# Patient Record
Sex: FEMALE | Race: WHITE | NOT HISPANIC OR LATINO
[De-identification: names, ages, dates, MRNs, and addresses within clinical notes are randomized per-mention and may not be internally consistent; named-entity substitution may affect disease eponyms.]

---
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
## 2019-02-26 ENCOUNTER — APPOINTMENT (OUTPATIENT)
Dept: INTERNAL MEDICINE | Facility: CLINIC | Age: 19
End: 2019-02-26

## 2023-07-02 ENCOUNTER — EMERGENCY (EMERGENCY)
Facility: HOSPITAL | Age: 23
LOS: 1 days | Discharge: HOME | End: 2023-07-02
Attending: EMERGENCY MEDICINE | Admitting: EMERGENCY MEDICINE
Payer: MEDICAID

## 2023-07-02 VITALS
TEMPERATURE: 98 F | SYSTOLIC BLOOD PRESSURE: 126 MMHG | WEIGHT: 186.95 LBS | DIASTOLIC BLOOD PRESSURE: 83 MMHG | RESPIRATION RATE: 16 BRPM | HEIGHT: 70 IN | HEART RATE: 83 BPM | OXYGEN SATURATION: 97 %

## 2023-07-02 VITALS
DIASTOLIC BLOOD PRESSURE: 70 MMHG | RESPIRATION RATE: 15 BRPM | SYSTOLIC BLOOD PRESSURE: 130 MMHG | HEART RATE: 78 BPM | TEMPERATURE: 98 F | OXYGEN SATURATION: 99 %

## 2023-07-02 LAB
ALBUMIN SERPL ELPH-MCNC: 4.1 G/DL — SIGNIFICANT CHANGE UP (ref 3.3–5)
ALP SERPL-CCNC: 58 U/L — SIGNIFICANT CHANGE UP (ref 30–120)
ALT FLD-CCNC: 15 U/L DA — SIGNIFICANT CHANGE UP (ref 10–60)
ANION GAP SERPL CALC-SCNC: 8 MMOL/L — SIGNIFICANT CHANGE UP (ref 5–17)
AST SERPL-CCNC: 18 U/L — SIGNIFICANT CHANGE UP (ref 10–40)
BASOPHILS # BLD AUTO: 0.02 K/UL — SIGNIFICANT CHANGE UP (ref 0–0.2)
BASOPHILS NFR BLD AUTO: 0.3 % — SIGNIFICANT CHANGE UP (ref 0–2)
BILIRUB SERPL-MCNC: 0.4 MG/DL — SIGNIFICANT CHANGE UP (ref 0.2–1.2)
BUN SERPL-MCNC: 20 MG/DL — SIGNIFICANT CHANGE UP (ref 7–23)
CALCIUM SERPL-MCNC: 9.1 MG/DL — SIGNIFICANT CHANGE UP (ref 8.4–10.5)
CHLORIDE SERPL-SCNC: 104 MMOL/L — SIGNIFICANT CHANGE UP (ref 96–108)
CO2 SERPL-SCNC: 27 MMOL/L — SIGNIFICANT CHANGE UP (ref 22–31)
CREAT SERPL-MCNC: 0.81 MG/DL — SIGNIFICANT CHANGE UP (ref 0.5–1.3)
D DIMER BLD IA.RAPID-MCNC: <150 NG/ML DDU — SIGNIFICANT CHANGE UP
EGFR: 104 ML/MIN/1.73M2 — SIGNIFICANT CHANGE UP
EOSINOPHIL # BLD AUTO: 0.05 K/UL — SIGNIFICANT CHANGE UP (ref 0–0.5)
EOSINOPHIL NFR BLD AUTO: 0.6 % — SIGNIFICANT CHANGE UP (ref 0–6)
GLUCOSE SERPL-MCNC: 94 MG/DL — SIGNIFICANT CHANGE UP (ref 70–99)
HCT VFR BLD CALC: 35.7 % — SIGNIFICANT CHANGE UP (ref 34.5–45)
HGB BLD-MCNC: 12.3 G/DL — SIGNIFICANT CHANGE UP (ref 11.5–15.5)
IMM GRANULOCYTES NFR BLD AUTO: 0.1 % — SIGNIFICANT CHANGE UP (ref 0–0.9)
LIDOCAIN IGE QN: 69 U/L — LOW (ref 73–393)
LYMPHOCYTES # BLD AUTO: 2.92 K/UL — SIGNIFICANT CHANGE UP (ref 1–3.3)
LYMPHOCYTES # BLD AUTO: 37 % — SIGNIFICANT CHANGE UP (ref 13–44)
MAGNESIUM SERPL-MCNC: 2.2 MG/DL — SIGNIFICANT CHANGE UP (ref 1.6–2.6)
MCHC RBC-ENTMCNC: 29.6 PG — SIGNIFICANT CHANGE UP (ref 27–34)
MCHC RBC-ENTMCNC: 34.5 GM/DL — SIGNIFICANT CHANGE UP (ref 32–36)
MCV RBC AUTO: 86 FL — SIGNIFICANT CHANGE UP (ref 80–100)
MONOCYTES # BLD AUTO: 0.64 K/UL — SIGNIFICANT CHANGE UP (ref 0–0.9)
MONOCYTES NFR BLD AUTO: 8.1 % — SIGNIFICANT CHANGE UP (ref 2–14)
NEUTROPHILS # BLD AUTO: 4.25 K/UL — SIGNIFICANT CHANGE UP (ref 1.8–7.4)
NEUTROPHILS NFR BLD AUTO: 53.9 % — SIGNIFICANT CHANGE UP (ref 43–77)
NRBC # BLD: 0 /100 WBCS — SIGNIFICANT CHANGE UP (ref 0–0)
PLATELET # BLD AUTO: 256 K/UL — SIGNIFICANT CHANGE UP (ref 150–400)
POTASSIUM SERPL-MCNC: 3.7 MMOL/L — SIGNIFICANT CHANGE UP (ref 3.5–5.3)
POTASSIUM SERPL-SCNC: 3.7 MMOL/L — SIGNIFICANT CHANGE UP (ref 3.5–5.3)
PROT SERPL-MCNC: 7 G/DL — SIGNIFICANT CHANGE UP (ref 6–8.3)
RBC # BLD: 4.15 M/UL — SIGNIFICANT CHANGE UP (ref 3.8–5.2)
RBC # FLD: 12.3 % — SIGNIFICANT CHANGE UP (ref 10.3–14.5)
SODIUM SERPL-SCNC: 139 MMOL/L — SIGNIFICANT CHANGE UP (ref 135–145)
TROPONIN I, HIGH SENSITIVITY RESULT: <4 NG/L — SIGNIFICANT CHANGE UP
WBC # BLD: 7.89 K/UL — SIGNIFICANT CHANGE UP (ref 3.8–10.5)
WBC # FLD AUTO: 7.89 K/UL — SIGNIFICANT CHANGE UP (ref 3.8–10.5)

## 2023-07-02 PROCEDURE — 71045 X-RAY EXAM CHEST 1 VIEW: CPT | Mod: 26

## 2023-07-02 PROCEDURE — 96360 HYDRATION IV INFUSION INIT: CPT

## 2023-07-02 PROCEDURE — 93005 ELECTROCARDIOGRAM TRACING: CPT

## 2023-07-02 PROCEDURE — 71045 X-RAY EXAM CHEST 1 VIEW: CPT

## 2023-07-02 PROCEDURE — 99285 EMERGENCY DEPT VISIT HI MDM: CPT | Mod: 25

## 2023-07-02 PROCEDURE — 85379 FIBRIN DEGRADATION QUANT: CPT

## 2023-07-02 PROCEDURE — 36415 COLL VENOUS BLD VENIPUNCTURE: CPT

## 2023-07-02 PROCEDURE — 83735 ASSAY OF MAGNESIUM: CPT

## 2023-07-02 PROCEDURE — 83690 ASSAY OF LIPASE: CPT

## 2023-07-02 PROCEDURE — 70450 CT HEAD/BRAIN W/O DYE: CPT | Mod: MA

## 2023-07-02 PROCEDURE — 80053 COMPREHEN METABOLIC PANEL: CPT

## 2023-07-02 PROCEDURE — 70450 CT HEAD/BRAIN W/O DYE: CPT | Mod: 26,MA

## 2023-07-02 PROCEDURE — 84484 ASSAY OF TROPONIN QUANT: CPT

## 2023-07-02 PROCEDURE — 85025 COMPLETE CBC W/AUTO DIFF WBC: CPT

## 2023-07-02 PROCEDURE — 99285 EMERGENCY DEPT VISIT HI MDM: CPT

## 2023-07-02 PROCEDURE — 93010 ELECTROCARDIOGRAM REPORT: CPT

## 2023-07-02 RX ORDER — SODIUM CHLORIDE 9 MG/ML
1000 INJECTION INTRAMUSCULAR; INTRAVENOUS; SUBCUTANEOUS ONCE
Refills: 0 | Status: COMPLETED | OUTPATIENT
Start: 2023-07-02 | End: 2023-07-02

## 2023-07-02 RX ADMIN — SODIUM CHLORIDE 1000 MILLILITER(S): 9 INJECTION INTRAMUSCULAR; INTRAVENOUS; SUBCUTANEOUS at 20:57

## 2023-07-02 RX ADMIN — SODIUM CHLORIDE 1000 MILLILITER(S): 9 INJECTION INTRAMUSCULAR; INTRAVENOUS; SUBCUTANEOUS at 19:57

## 2023-07-02 NOTE — ED PROVIDER NOTE - PROVIDER TOKENS
PROVIDER:[TOKEN:[[id number]],FOLLOWUP:[1-3 Days]],PROVIDER:[TOKEN:[[id number]],FOLLOWUP:[1-3 Days]]

## 2023-07-02 NOTE — ED ADULT TRIAGE NOTE - CHIEF COMPLAINT QUOTE
Reason for exam: screening  (asymptomatic).

Last mammogram was performed 1 year and 4 months ago.



History:

Patient is postmenopausal.

Benign right mammotome panel of the right breast, February 5, 2007.

Took progesterone for 2 years beginning at age 46.



Physical Findings:

A clinical breast exam by your physician is recommended on an annual basis and 

results should be correlated with mammographic findings.



MG Screening Mammo w CAD

Bilateral CC and MLO view(s) were taken.

Prior study comparison: June 20, 2018, bilateral MG screening mammo w CAD.  

February 11, 2015, bilateral MG screening mammo w CAD.

The breast tissue is heterogeneously dense. This may lower the sensitivity of 

mammography.  Benign appearing calcifications in the left breast. No suspicious 

abnormality. Right biopsy marker noted.  No significant changes when compared with

prior studies.





ASSESSMENT: Benign, BI-RAD 2



RECOMMENDATION:

Routine screening mammogram of both breasts in 1 year.
" I was in the shower at home about 2 hours ago - I passed out . Yesterday , at work I felt , I saw black - very dizzy but I did not pass out - happened twice  today too " (+) headache Pt on Adderall and Birth control pills

## 2023-07-02 NOTE — ED PROVIDER NOTE - CLINICAL SUMMARY MEDICAL DECISION MAKING FREE TEXT BOX
syncopal episode tonight, few presyncopal episodes the past day, unclear etiology - iv, labs, hydrate, ekg, cxr, reassess

## 2023-07-02 NOTE — ED PROVIDER NOTE - CARE PROVIDER_API CALL
Jarvis Mendoza  Cardiology  [address], [address]  [place] [address]  Phone: [phone]  Fax: [phone]  Follow Up Time: 1-3 Days    Boaz Meade  Neurology  [address] [place] [address]  Phone: [phone]  Fax: [phone]  Follow Up Time: 1-3 Days

## 2023-07-02 NOTE — ED PROVIDER NOTE - PROGRESS NOTE DETAILS
discussed results, pt asymptomatic at this time, copy of results provided, will refer to neuro and cardiology

## 2023-07-02 NOTE — ED ADULT NURSE NOTE - CHIEF COMPLAINT QUOTE
" I was in the shower at home about 2 hours ago - I passed out . Yesterday , at work I felt , I saw black - very dizzy but I did not pass out - happened twice  today too " (+) headache Pt on Adderall and Birth control pills

## 2023-07-02 NOTE — ED PROVIDER NOTE - PATIENT PORTAL LINK FT
You can access the FollowMyHealth Patient Portal offered by [facility] by registering at the following website: [email or web address]. By joining [employer]’s FollowMyHealth portal, you will also be able to view your health information using other applications (apps) compatible with our system.

## 2023-07-02 NOTE — ED PROVIDER NOTE - OBJECTIVE STATEMENT
23 y.o. 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

## 2023-07-02 NOTE — ED ADULT NURSE NOTE - OBJECTIVE STATEMENT
Pt is alert and oriented. Pt states that yesterday she had a near syncopal episode. Pt states that today she passed out in the shower. Pt states that she has an 8/10 headache and slightly blurry vision. Pt states that she is unsure if she hit her head today. Pt denies sob, weakness, chest pain, nausea, vomiting, and dizziness. Pt resp are even and unlabored, skin color rashawn for race. Pt updated on plan of care. Pt placed on tele. Tele tech aware.

## 2023-08-24 ENCOUNTER — NON-APPOINTMENT (OUTPATIENT)
Age: 23
End: 2023-08-24

## 2023-10-12 ENCOUNTER — NON-APPOINTMENT (OUTPATIENT)
Age: 23
End: 2023-10-12

## 2023-10-26 NOTE — ED ADULT NURSE NOTE - DRUG PRE-SCREENING (DAST -1)
Statement Selected Xenograft Text: The defect edges were debeveled with a #[id number] scalpel blade.  Given the location of the defect, shape of the defect and the proximity to free margins a xenograft was deemed most appropriate.  The graft was then trimmed to fit the size of the defect.  The graft was then placed in the primary defect and oriented appropriately.

## 2024-08-25 ENCOUNTER — INPATIENT (INPATIENT)
Facility: HOSPITAL | Age: 24
LOS: 2 days | Discharge: HOME | DRG: 872 | End: 2024-08-28
Attending: HOSPITALIST | Admitting: HOSPITALIST
Payer: SELF-PAY

## 2024-08-25 VITALS
SYSTOLIC BLOOD PRESSURE: 110 MMHG | WEIGHT: 210.1 LBS | RESPIRATION RATE: 18 BRPM | HEIGHT: 69 IN | DIASTOLIC BLOOD PRESSURE: 77 MMHG | HEART RATE: 133 BPM | TEMPERATURE: 100 F | OXYGEN SATURATION: 98 %

## 2024-08-25 PROCEDURE — 99053 MED SERV 10PM-8AM 24 HR FAC: CPT

## 2024-08-25 PROCEDURE — 10061 I&D ABSCESS COMP/MULTIPLE: CPT

## 2024-08-25 PROCEDURE — 99285 EMERGENCY DEPT VISIT HI MDM: CPT | Mod: 25

## 2024-08-25 RX ORDER — LIDOCAINE HCL 20 MG/ML
10 VIAL (ML) INJECTION ONCE
Refills: 0 | Status: COMPLETED | OUTPATIENT
Start: 2024-08-25 | End: 2024-08-25

## 2024-08-25 NOTE — ED PROVIDER NOTE - OBJECTIVE STATEMENT
24 female with no past medical history now presenting with a lump to her left lower quadrant for the past 5 days.  Patient reports developing a small pimple on the left lower quadrant of her abdomen, gradually noticed swelling/redness of the area, this morning noticed a large swelling with a positive on the surface, patient attempted to drain the posterolateral which noticed bleeding/purulent discharge.  Also mentions having heightened sensitivity of the skin at same site, however applying lidocaine patches, using Advil to no relief.  Presents to the ED for further evaluation/management.  Denies fevers, chills, lightheadedness, neck stiffness, chest pain, shortness of breath, anorexia, vomitings, abdominal distention, urinary/bowel complaints, skin rash.  Mentions having a predisposition to folliculitis but no similar history in the past.

## 2024-08-25 NOTE — ED ADULT NURSE NOTE - OBJECTIVE STATEMENT
24Y F AXO4 no PMH or PSH presented to the ED from home c/o abscess on L lower abdominal quadrant. Pt reports noticing a "pimple on L lower abdominal quadrant that then became purple." Pt reports popping pimple today and noticed worsening redness and warmth. Pt reports taking "Motrin earlier today for pain." Upon arrival to the ED, the pt is well appearing, has bilateral even and unlabored chest rise, and ambulatory with steady gait. Upon assessment, pt has even and bilateral peripheral pulses, ROM, PERRLA, gross neuro intact, and soft, non-tender, non-distended abdomen. Red and warm abscess noted to L lower abdominal quadrant. Pt denies fevers, chills, chest pain, SOB, n/v/d, urinary symptoms, and black or bloody stools. Comfort and safety provided.

## 2024-08-25 NOTE — ED PROVIDER NOTE - ATTENDING CONTRIBUTION TO CARE
García Erickson DO: I have personally performed a face to face medical and diagnostic evaluation of the patient. I have discussed with and reviewed the Resident's and/or ACP's and/or Medical/PA/NP student's note and agree with the History, ROS, Physical Exam and MDM unless otherwise indicated. A brief summary of my personal evaluation and impression can be found below.     24 female with no past medical history now presenting with a lump to her left lower quadrant for the past 5 days. Patient reports developing a small pimple on the left lower quadrant of her abdomen, gradually noticed swelling/redness of the area, this morning noticed a large swelling with a positive on the surface, patient attempted to drain the posterolateral which noticed bleeding/purulent discharge.  Also mentions having heightened sensitivity of the skin at same site, however applying lidocaine patches, using Advil to no relief.    CONSTITUTIONAL: NAD  SKIN: Large 8 inch area of warm indurated erythema over left lower quadrant of abdomen with approximately 1 inch area of fluctuance purpleish in color with some minimal drainage over abdomen.  No lower abdominal tenderness.  HEAD: NCAT  EYES: EOMI, PERRLA, no scleral icterus, conjunctiva pink  NECK: Supple; non tender. Full ROM.  CARD: tachycardia 130  RESP: No respiratory distress  ABD: Abdomen skin as above, no other abdominal tenderness.  MSK: Full ROM, no leg swelling  PSYCH: Cooperative, appropriate.    Patient has large area of cellulitis with area in center concerning for abscess, is already draining spontaneously we will ultrasound, given patient's tachycardia, low-grade temperature of 100.1 orally will cover with antibiotics, give IV fluids, patient likely to be admitted for sepsis secondary to swelling.  At this time not concern for intra-abdominal cause of patient's redness, convincing story for nidus of infection through skin, if any concern exists on ultrasound will consider CT imaging.

## 2024-08-25 NOTE — ED ADULT NURSE NOTE - CHIEF COMPLAINT QUOTE
has lump LLQ of abdomen that began as a pimple 5 days ago; enlarging; now purple and painful; pt attempted to pop it herself.

## 2024-08-25 NOTE — ED PROVIDER NOTE - CLINICAL SUMMARY MEDICAL DECISION MAKING FREE TEXT BOX
24 female with no past medical history now presenting with a lump to her left lower quadrant for the past 5 days. VSS. On exam Redness/swelling 4 x 5 cm in size to LLQ abd, with central pustule 2 cm - able to manually express blood/pus from site. Concern for abdominal wall abscess/cellulitis. Will consider Abx. Shall encourage follow up with PCP as outpatient to eval for resolution.

## 2024-08-25 NOTE — ED ADULT TRIAGE NOTE - CHIEF COMPLAINT QUOTE
has lump LLQ of abdomen that began as a pimple 5 days ago; enlarging; now purple and painful has lump LLQ of abdomen that began as a pimple 5 days ago; enlarging; now purple and painful; pt attempted to pop it herself.

## 2024-08-25 NOTE — ED PROVIDER NOTE - PHYSICAL EXAMINATION
PHYSICAL EXAM:  GENERAL: NAD, lying in bed comfortably  HEAD:  Atraumatic, Normocephalic  EYES: EOMI, PERRLA, conjunctiva and sclera clear  ENT: No erythema/pallor/petechiae/lesions  NECK: Supple  LUNG: CTA b/l  HEART: RRR, +S1/S2  ABDOMEN: soft, NT/ND; BS audible   EXTREMITIES:  2+ Peripheral Pulses, brisk cap refill.  NERVOUS SYSTEM:  AAOx3, speech clear. No sensory/motor deficits   SKIN: Redness/swelling 4 x 5 cm in size to LLQ abd, central pustule 2 cm - able to manually express blood/pus from site.

## 2024-08-26 DIAGNOSIS — L02.91 CUTANEOUS ABSCESS, UNSPECIFIED: ICD-10-CM

## 2024-08-26 DIAGNOSIS — A41.9 SEPSIS, UNSPECIFIED ORGANISM: ICD-10-CM

## 2024-08-26 DIAGNOSIS — Z29.9 ENCOUNTER FOR PROPHYLACTIC MEASURES, UNSPECIFIED: ICD-10-CM

## 2024-08-26 DIAGNOSIS — L02.211 CUTANEOUS ABSCESS OF ABDOMINAL WALL: ICD-10-CM

## 2024-08-26 DIAGNOSIS — Z98.890 OTHER SPECIFIED POSTPROCEDURAL STATES: Chronic | ICD-10-CM

## 2024-08-26 PROBLEM — F98.8 OTHER SPECIFIED BEHAVIORAL AND EMOTIONAL DISORDERS WITH ONSET USUALLY OCCURRING IN CHILDHOOD AND ADOLESCENCE: Chronic | Status: ACTIVE | Noted: 2023-07-02

## 2024-08-26 LAB
ALBUMIN SERPL ELPH-MCNC: 4.6 G/DL — SIGNIFICANT CHANGE UP (ref 3.3–5)
ALP SERPL-CCNC: 82 U/L — SIGNIFICANT CHANGE UP (ref 40–120)
ALT FLD-CCNC: 15 U/L — SIGNIFICANT CHANGE UP (ref 10–45)
ANION GAP SERPL CALC-SCNC: 14 MMOL/L — SIGNIFICANT CHANGE UP (ref 5–17)
ANION GAP SERPL CALC-SCNC: 14 MMOL/L — SIGNIFICANT CHANGE UP (ref 5–17)
APPEARANCE UR: CLEAR — SIGNIFICANT CHANGE UP
APTT BLD: 30 SEC — SIGNIFICANT CHANGE UP (ref 24.5–35.6)
AST SERPL-CCNC: 14 U/L — SIGNIFICANT CHANGE UP (ref 10–40)
BACTERIA # UR AUTO: ABNORMAL /HPF
BASOPHILS # BLD AUTO: 0.04 K/UL — SIGNIFICANT CHANGE UP (ref 0–0.2)
BASOPHILS # BLD AUTO: 0.05 K/UL — SIGNIFICANT CHANGE UP (ref 0–0.2)
BASOPHILS NFR BLD AUTO: 0.4 % — SIGNIFICANT CHANGE UP (ref 0–2)
BASOPHILS NFR BLD AUTO: 0.4 % — SIGNIFICANT CHANGE UP (ref 0–2)
BILIRUB SERPL-MCNC: 0.4 MG/DL — SIGNIFICANT CHANGE UP (ref 0.2–1.2)
BILIRUB UR-MCNC: NEGATIVE — SIGNIFICANT CHANGE UP
BUN SERPL-MCNC: 10 MG/DL — SIGNIFICANT CHANGE UP (ref 7–23)
BUN SERPL-MCNC: 11 MG/DL — SIGNIFICANT CHANGE UP (ref 7–23)
CALCIUM SERPL-MCNC: 10 MG/DL — SIGNIFICANT CHANGE UP (ref 8.4–10.5)
CALCIUM SERPL-MCNC: 9.5 MG/DL — SIGNIFICANT CHANGE UP (ref 8.4–10.5)
CAST: 0 /LPF — SIGNIFICANT CHANGE UP (ref 0–4)
CHLORIDE SERPL-SCNC: 102 MMOL/L — SIGNIFICANT CHANGE UP (ref 96–108)
CHLORIDE SERPL-SCNC: 108 MMOL/L — SIGNIFICANT CHANGE UP (ref 96–108)
CO2 SERPL-SCNC: 20 MMOL/L — LOW (ref 22–31)
CO2 SERPL-SCNC: 22 MMOL/L — SIGNIFICANT CHANGE UP (ref 22–31)
COLOR SPEC: YELLOW — SIGNIFICANT CHANGE UP
CREAT SERPL-MCNC: 0.79 MG/DL — SIGNIFICANT CHANGE UP (ref 0.5–1.3)
CREAT SERPL-MCNC: 0.84 MG/DL — SIGNIFICANT CHANGE UP (ref 0.5–1.3)
DIFF PNL FLD: ABNORMAL
EGFR: 106 ML/MIN/1.73M2 — SIGNIFICANT CHANGE UP
EGFR: 99 ML/MIN/1.73M2 — SIGNIFICANT CHANGE UP
EOSINOPHIL # BLD AUTO: 0.03 K/UL — SIGNIFICANT CHANGE UP (ref 0–0.5)
EOSINOPHIL # BLD AUTO: 0.05 K/UL — SIGNIFICANT CHANGE UP (ref 0–0.5)
EOSINOPHIL NFR BLD AUTO: 0.2 % — SIGNIFICANT CHANGE UP (ref 0–6)
EOSINOPHIL NFR BLD AUTO: 0.5 % — SIGNIFICANT CHANGE UP (ref 0–6)
FLUAV AG NPH QL: SIGNIFICANT CHANGE UP
FLUBV AG NPH QL: SIGNIFICANT CHANGE UP
GAS PNL BLDV: SIGNIFICANT CHANGE UP
GLUCOSE SERPL-MCNC: 89 MG/DL — SIGNIFICANT CHANGE UP (ref 70–99)
GLUCOSE SERPL-MCNC: 96 MG/DL — SIGNIFICANT CHANGE UP (ref 70–99)
GLUCOSE UR QL: NEGATIVE MG/DL — SIGNIFICANT CHANGE UP
HCT VFR BLD CALC: 36.5 % — SIGNIFICANT CHANGE UP (ref 34.5–45)
HCT VFR BLD CALC: 40.3 % — SIGNIFICANT CHANGE UP (ref 34.5–45)
HGB BLD-MCNC: 12.1 G/DL — SIGNIFICANT CHANGE UP (ref 11.5–15.5)
HGB BLD-MCNC: 13.7 G/DL — SIGNIFICANT CHANGE UP (ref 11.5–15.5)
IMM GRANULOCYTES NFR BLD AUTO: 0.4 % — SIGNIFICANT CHANGE UP (ref 0–0.9)
IMM GRANULOCYTES NFR BLD AUTO: 0.5 % — SIGNIFICANT CHANGE UP (ref 0–0.9)
INR BLD: 1.07 RATIO — SIGNIFICANT CHANGE UP (ref 0.85–1.18)
KETONES UR-MCNC: NEGATIVE MG/DL — SIGNIFICANT CHANGE UP
LEUKOCYTE ESTERASE UR-ACNC: ABNORMAL
LYMPHOCYTES # BLD AUTO: 18.7 % — SIGNIFICANT CHANGE UP (ref 13–44)
LYMPHOCYTES # BLD AUTO: 2.36 K/UL — SIGNIFICANT CHANGE UP (ref 1–3.3)
LYMPHOCYTES # BLD AUTO: 2.56 K/UL — SIGNIFICANT CHANGE UP (ref 1–3.3)
LYMPHOCYTES # BLD AUTO: 23.2 % — SIGNIFICANT CHANGE UP (ref 13–44)
MCHC RBC-ENTMCNC: 29.1 PG — SIGNIFICANT CHANGE UP (ref 27–34)
MCHC RBC-ENTMCNC: 30.1 PG — SIGNIFICANT CHANGE UP (ref 27–34)
MCHC RBC-ENTMCNC: 33.2 GM/DL — SIGNIFICANT CHANGE UP (ref 32–36)
MCHC RBC-ENTMCNC: 34 GM/DL — SIGNIFICANT CHANGE UP (ref 32–36)
MCV RBC AUTO: 87.7 FL — SIGNIFICANT CHANGE UP (ref 80–100)
MCV RBC AUTO: 88.6 FL — SIGNIFICANT CHANGE UP (ref 80–100)
MONOCYTES # BLD AUTO: 1.09 K/UL — HIGH (ref 0–0.9)
MONOCYTES # BLD AUTO: 1.12 K/UL — HIGH (ref 0–0.9)
MONOCYTES NFR BLD AUTO: 10.7 % — SIGNIFICANT CHANGE UP (ref 2–14)
MONOCYTES NFR BLD AUTO: 8.2 % — SIGNIFICANT CHANGE UP (ref 2–14)
NEUTROPHILS # BLD AUTO: 6.59 K/UL — SIGNIFICANT CHANGE UP (ref 1.8–7.4)
NEUTROPHILS # BLD AUTO: 9.89 K/UL — HIGH (ref 1.8–7.4)
NEUTROPHILS NFR BLD AUTO: 64.7 % — SIGNIFICANT CHANGE UP (ref 43–77)
NEUTROPHILS NFR BLD AUTO: 72.1 % — SIGNIFICANT CHANGE UP (ref 43–77)
NITRITE UR-MCNC: NEGATIVE — SIGNIFICANT CHANGE UP
NRBC # BLD: 0 /100 WBCS — SIGNIFICANT CHANGE UP (ref 0–0)
NRBC # BLD: 0 /100 WBCS — SIGNIFICANT CHANGE UP (ref 0–0)
PH UR: 6 — SIGNIFICANT CHANGE UP (ref 5–8)
PLATELET # BLD AUTO: 258 K/UL — SIGNIFICANT CHANGE UP (ref 150–400)
PLATELET # BLD AUTO: 319 K/UL — SIGNIFICANT CHANGE UP (ref 150–400)
POTASSIUM SERPL-MCNC: 3.2 MMOL/L — LOW (ref 3.5–5.3)
POTASSIUM SERPL-MCNC: 3.8 MMOL/L — SIGNIFICANT CHANGE UP (ref 3.5–5.3)
POTASSIUM SERPL-SCNC: 3.2 MMOL/L — LOW (ref 3.5–5.3)
POTASSIUM SERPL-SCNC: 3.8 MMOL/L — SIGNIFICANT CHANGE UP (ref 3.5–5.3)
PROT SERPL-MCNC: 7.7 G/DL — SIGNIFICANT CHANGE UP (ref 6–8.3)
PROT UR-MCNC: NEGATIVE MG/DL — SIGNIFICANT CHANGE UP
PROTHROM AB SERPL-ACNC: 11.8 SEC — SIGNIFICANT CHANGE UP (ref 9.5–13)
RBC # BLD: 4.16 M/UL — SIGNIFICANT CHANGE UP (ref 3.8–5.2)
RBC # BLD: 4.55 M/UL — SIGNIFICANT CHANGE UP (ref 3.8–5.2)
RBC # FLD: 11.5 % — SIGNIFICANT CHANGE UP (ref 10.3–14.5)
RBC # FLD: 11.6 % — SIGNIFICANT CHANGE UP (ref 10.3–14.5)
RBC CASTS # UR COMP ASSIST: 0 /HPF — SIGNIFICANT CHANGE UP (ref 0–4)
REVIEW: SIGNIFICANT CHANGE UP
RSV RNA NPH QL NAA+NON-PROBE: SIGNIFICANT CHANGE UP
SARS-COV-2 RNA SPEC QL NAA+PROBE: SIGNIFICANT CHANGE UP
SODIUM SERPL-SCNC: 138 MMOL/L — SIGNIFICANT CHANGE UP (ref 135–145)
SODIUM SERPL-SCNC: 142 MMOL/L — SIGNIFICANT CHANGE UP (ref 135–145)
SP GR SPEC: 1.01 — SIGNIFICANT CHANGE UP (ref 1–1.03)
SQUAMOUS # UR AUTO: 3 /HPF — SIGNIFICANT CHANGE UP (ref 0–5)
UROBILINOGEN FLD QL: 0.2 MG/DL — SIGNIFICANT CHANGE UP (ref 0.2–1)
WBC # BLD: 10.18 K/UL — SIGNIFICANT CHANGE UP (ref 3.8–10.5)
WBC # BLD: 13.7 K/UL — HIGH (ref 3.8–10.5)
WBC # FLD AUTO: 10.18 K/UL — SIGNIFICANT CHANGE UP (ref 3.8–10.5)
WBC # FLD AUTO: 13.7 K/UL — HIGH (ref 3.8–10.5)
WBC UR QL: 3 /HPF — SIGNIFICANT CHANGE UP (ref 0–5)

## 2024-08-26 PROCEDURE — 76705 ECHO EXAM OF ABDOMEN: CPT | Mod: 26

## 2024-08-26 PROCEDURE — 99253 IP/OBS CNSLTJ NEW/EST LOW 45: CPT

## 2024-08-26 PROCEDURE — 99223 1ST HOSP IP/OBS HIGH 75: CPT

## 2024-08-26 RX ORDER — DOXYCYCLINE MONOHYDRATE 100 MG
100 TABLET ORAL EVERY 12 HOURS
Refills: 0 | Status: DISCONTINUED | OUTPATIENT
Start: 2024-08-26 | End: 2024-08-26

## 2024-08-26 RX ORDER — SODIUM CHLORIDE 9 MG/ML
1000 INJECTION INTRAMUSCULAR; INTRAVENOUS; SUBCUTANEOUS ONCE
Refills: 0 | Status: COMPLETED | OUTPATIENT
Start: 2024-08-26 | End: 2024-08-26

## 2024-08-26 RX ORDER — DOXYCYCLINE MONOHYDRATE 100 MG
100 TABLET ORAL ONCE
Refills: 0 | Status: COMPLETED | OUTPATIENT
Start: 2024-08-26 | End: 2024-08-26

## 2024-08-26 RX ORDER — VANCOMYCIN/0.9 % SOD CHLORIDE 1.75G/25
1500 PLASTIC BAG, INJECTION (ML) INTRAVENOUS EVERY 12 HOURS
Refills: 0 | Status: DISCONTINUED | OUTPATIENT
Start: 2024-08-26 | End: 2024-08-26

## 2024-08-26 RX ORDER — DAPTOMYCIN 500 MG/10ML
400 INJECTION, POWDER, LYOPHILIZED, FOR SOLUTION INTRAVENOUS ONCE
Refills: 0 | Status: COMPLETED | OUTPATIENT
Start: 2024-08-26 | End: 2024-08-26

## 2024-08-26 RX ORDER — DAPTOMYCIN 500 MG/10ML
400 INJECTION, POWDER, LYOPHILIZED, FOR SOLUTION INTRAVENOUS EVERY 24 HOURS
Refills: 0 | Status: DISCONTINUED | OUTPATIENT
Start: 2024-08-27 | End: 2024-08-28

## 2024-08-26 RX ORDER — DAPTOMYCIN 500 MG/10ML
INJECTION, POWDER, LYOPHILIZED, FOR SOLUTION INTRAVENOUS
Refills: 0 | Status: DISCONTINUED | OUTPATIENT
Start: 2024-08-26 | End: 2024-08-28

## 2024-08-26 RX ORDER — ACETAMINOPHEN 325 MG/1
975 TABLET ORAL ONCE
Refills: 0 | Status: COMPLETED | OUTPATIENT
Start: 2024-08-26 | End: 2024-08-26

## 2024-08-26 RX ORDER — ACETAMINOPHEN 325 MG/1
650 TABLET ORAL EVERY 6 HOURS
Refills: 0 | Status: DISCONTINUED | OUTPATIENT
Start: 2024-08-26 | End: 2024-08-28

## 2024-08-26 RX ADMIN — Medication 100 MILLILITER(S): at 09:14

## 2024-08-26 RX ADMIN — ACETAMINOPHEN 975 MILLIGRAM(S): 325 TABLET ORAL at 04:10

## 2024-08-26 RX ADMIN — Medication 100 MILLIGRAM(S): at 19:23

## 2024-08-26 RX ADMIN — DAPTOMYCIN 400 MILLIGRAM(S): 500 INJECTION, POWDER, LYOPHILIZED, FOR SOLUTION INTRAVENOUS at 22:20

## 2024-08-26 RX ADMIN — SODIUM CHLORIDE 1000 MILLILITER(S): 9 INJECTION INTRAMUSCULAR; INTRAVENOUS; SUBCUTANEOUS at 00:21

## 2024-08-26 RX ADMIN — ACETAMINOPHEN 650 MILLIGRAM(S): 325 TABLET ORAL at 15:02

## 2024-08-26 RX ADMIN — ACETAMINOPHEN 650 MILLIGRAM(S): 325 TABLET ORAL at 22:40

## 2024-08-26 RX ADMIN — ACETAMINOPHEN 975 MILLIGRAM(S): 325 TABLET ORAL at 01:39

## 2024-08-26 RX ADMIN — Medication 100 MILLIGRAM(S): at 08:11

## 2024-08-26 RX ADMIN — Medication 10 MILLILITER(S): at 00:29

## 2024-08-26 RX ADMIN — Medication 100 MILLIGRAM(S): at 00:20

## 2024-08-26 RX ADMIN — Medication 300 MILLIGRAM(S): at 13:38

## 2024-08-26 RX ADMIN — ACETAMINOPHEN 650 MILLIGRAM(S): 325 TABLET ORAL at 21:10

## 2024-08-26 RX ADMIN — ACETAMINOPHEN 650 MILLIGRAM(S): 325 TABLET ORAL at 14:00

## 2024-08-26 RX ADMIN — SODIUM CHLORIDE 1000 MILLILITER(S): 9 INJECTION INTRAMUSCULAR; INTRAVENOUS; SUBCUTANEOUS at 01:15

## 2024-08-26 NOTE — CONSULT NOTE ADULT - SUBJECTIVE AND OBJECTIVE BOX
[facility]  Division of Infectious Diseases  [phone]    CINTIA AWAD  24y, Female  [id number]    HPI--  24-year-old woman with no significant past medical history developed red tender area in the left lower quadrant area which became fluctuant over a period of days.  She tried to drain herself with a needle but only with Aquacel Ag serous fluid.  Thought might have been ingrown hair, as it has happened before but this is not similar to prior episodes.  Ultimately she presented to the emergency room where the lesion drained purulent fluid spontaneously and then had formal incision and drainage performed.  No culture data appears to be pending at this point in time from the incision and drainage.  There is no MRSA PCR.    Patient denies antibiotic use.  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  None of them have been sick as of late.  They do occasionally go to  as well.  No sick contacts in the home.  Lives with her boyfriend who is well.  Pet cats, not ill.  No travel.    Patient gives a history of penicillin allergy at age 2 with Swelling. Here in the emergency room patient was given vancomycin and developed swelling of the right ear with redness of the neck.  No other symptoms of allergy.  Could have been an "red man" syndrome as it occurred during infusion.      No S/sx of systemic illness.     PMH/PSH--  ADD (attention deficit disorder) without hyperactivity    History of ankle surgery        Allergies--  penicillin (Rash)  amoxicillin (Rash)      Medications--  Antibiotics: doxycycline monohydrate Capsule 100 milliGRAM(s) Oral every 12 hours    Immunologic:   Other: acetaminophen     Tablet .. PRN    Antimicrobials last 90 days per EMR: MEDICATIONS  (STANDING):  doxycycline IVPB   100 mL/Hr IV Intermittent (08-26-24 @ 00:20)    doxycycline monohydrate Capsule   100 milliGRAM(s) Oral (08-26-24 @ 19:23)    doxycycline monohydrate Capsule   100 milliGRAM(s) Oral (08-26-24 @ 08:11)    vancomycin  IVPB   300 mL/Hr IV Intermittent (08-26-24 @ 13:38)        Social History--  EtOH: occasional  Tobacco: denies but vapes niicotine   Drug Use: denies     Family/Marital History--  No pertinent family history in first degree relatives    FH: diabetes mellitus (Father)          Travel/Environmental/Occupational History:  As above    Review of Systems:  A >=10-point review of systems was obtained.   Review of systems otherwise negative except as previously noted.    Physical Exam--  Vital Signs: T(F): 98 (08-26-24 @ 19:30), Max: 100.6 (08-26-24 @ 00:54)  HR: 78 (08-26-24 @ 19:30)  BP: 117/83 (08-26-24 @ 19:30)  RR: 20 (08-26-24 @ 19:30)  SpO2: 98% (08-26-24 @ 19:30)  Wt(kg): --  General: Nontoxic-appearing Female in no acute distress.  HEENT: AT/NC. Anicteric. Conjunctiva pink and moist. Oropharynx clear.   Neck: Not rigid. No sense of mass.  Nodes: None palpable.  Lungs: Clear bilaterally without rales, wheezing or rhonchi  Heart: Regular rate and rhythm. No Murmur. No rub. No gallop. No palpable thrill.  Abdomen: Bowel sounds present and normoactive. Soft. Nondistended. Nontender. No sense of mass. No organomegaly. I&D site dressed.   Extremities: No cyanosis or clubbing. No edema.   Skin: Warm. Dry. Good turgor. No rash. No vasculitic stigmata.  Psychiatric: Appropriate affect and mood for situation.         Laboratory & Imaging Data--  CBC                        12.1   10.18 )-----------( 258      ( 26 Aug 2024 08:25 )             36.5       Chemistries  08-26    142  |  108  |  10  ----------------------------<  96  3.8   |  20<L>  |  0.79    Ca    9.5      26 Aug 2024 08:25    TPro  7.7  /  Alb  4.6  /  TBili  0.4  /  DBili  x   /  AST  14  /  ALT  15  /  AlkPhos  82  08-26      Culture Data

## 2024-08-26 NOTE — H&P ADULT - HISTORY OF PRESENT ILLNESS
24 year old female with no significant medical history presents with worsening abdominal wall LLQ redness, swelling and pain with drainage of pus and blood this morning.  Patient noticed "a pimple-like" lesion on her left lower abdominal wall 5 days ago, initially thought was a bug bite, but progressively worse with redness, swelling and pain "due to the pressure inside".  Denies fever/chill, sick contact, trauma, prior history of skin infection, immunocompromised state or drugs.  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  No prior MRSA colonization

## 2024-08-26 NOTE — CONSULT NOTE ADULT - ASSESSMENT
S/P I&D of abscess  Possible red man syndrome for vancomycin  Unfortunately no cx data  Drainage the mainstay of therapy, abx secondary      Daptomycin 4mg./kg/d  Hope to change to PO tomorrow  Left message for primary team    Thank you for the courtesy of this referral.  Pedro Olmedo MD  Attending Physician  [facility]  Division of Infectious Diseases  [phone]

## 2024-08-26 NOTE — H&P ADULT - PROBLEM SELECTOR PLAN 1
s/p I&D in ED, not sure if culture was sent  - will continue Doxy for now (PCN allergy)  - will follow up blood culture and MRSA PCR  - will monitor fever curve and clinically s/p I&D in ED, not sure if culture was sent  - will continue Doxy for now (PCN allergy)  - will follow up blood culture and MRSA PCR  - will monitor fever curve and clinically  - wound care eval

## 2024-08-26 NOTE — PROGRESS NOTE ADULT - SUBJECTIVE AND OBJECTIVE BOX
Eric Stout   contact via pager or TEAMS        CC: Patient is a 24y old  Female who presents with a chief complaint of abdominal wall wound (26 Aug 2024 07:47)      SUBJECTIVE / OVERNIGHT EVENTS:    MEDICATIONS  (STANDING):  doxycycline monohydrate Capsule 100 milliGRAM(s) Oral every 12 hours    MEDICATIONS  (PRN):  acetaminophen     Tablet .. 650 milliGRAM(s) Oral every 6 hours PRN Temp greater or equal to 38C (100.4F), Mild Pain (1 - 3)      Vital Signs Last 24 Hrs  T(C): 36.3 (26 Aug 2024 09:13), Max: 38.1 (26 Aug 2024 00:54)  T(F): 97.4 (26 Aug 2024 09:13), Max: 100.6 (26 Aug 2024 00:54)  HR: 74 (26 Aug 2024 09:13) (74 - 133)  BP: 101/60 (26 Aug 2024 09:13) (101/60 - 133/94)  BP(mean): 106 (26 Aug 2024 01:30) (96 - 106)  RR: 18 (26 Aug 2024 09:13) (14 - 18)  SpO2: 96% (26 Aug 2024 09:13) (96% - 100%)  CAPILLARY BLOOD GLUCOSE        I&O's Summary    tele:    PHYSICAL EXAM:    GENERAL: NAD   HEENT: EOMI, PERRL  PULM: Clear to auscultation bilaterally  CV: Regular rate and rhythm; nl S1, S2; No murmurs, rubs, or gallops  ABDOMEN: Soft, Nontender, Nondistended; Bowel sounds present  EXTREMITIES/MSK:  No edema, calf tenderness   PSYCH: AAOx3  NEUROLOGY: non-focal          LABS:                        12.1   10.18 )-----------( 258      ( 26 Aug 2024 08:25 )             36.5     08-26    142  |  108  |  10  ----------------------------<  96  3.8   |  20<L>  |  0.79    Ca    9.5      26 Aug 2024 08:25    TPro  7.7  /  Alb  4.6  /  TBili  0.4  /  DBili  x   /  AST  14  /  ALT  15  /  AlkPhos  82  08-26    PT/INR - ( 26 Aug 2024 00:33 )   PT: 11.8 sec;   INR: 1.07 ratio         PTT - ( 26 Aug 2024 00:33 )  PTT:30.0 sec      Urinalysis Basic - ( 26 Aug 2024 08:25 )    Color: x / Appearance: x / SG: x / pH: x  Gluc: 96 mg/dL / Ketone: x  / Bili: x / Urobili: x   Blood: x / Protein: x / Nitrite: x   Leuk Esterase: x / RBC: x / WBC x   Sq Epi: x / Non Sq Epi: x / Bacteria: x          RADIOLOGY & ADDITIONAL TESTS:    Imaging Personally Reviewed:    Consultant(s) Notes Reviewed:      Care Discussed with Consultants/Other Providers:   Eric Stout   contact via pager or TEAMS        CC: Patient is a 24y old  Female who presents with a chief complaint of abdominal wall wound (26 Aug 2024 07:47)      SUBJECTIVE / OVERNIGHT EVENTS: pt denies any fevers/sweats/chills/N/V. area started as a pimple; no trauma to area; no water source (e.g., salt water, fresh water) or animal exposure to area previously    MEDICATIONS  (STANDING):  doxycycline monohydrate Capsule 100 milliGRAM(s) Oral every 12 hours    MEDICATIONS  (PRN):  acetaminophen     Tablet .. 650 milliGRAM(s) Oral every 6 hours PRN Temp greater or equal to 38C (100.4F), Mild Pain (1 - 3)      Vital Signs Last 24 Hrs  T(C): 36.3 (26 Aug 2024 09:13), Max: 38.1 (26 Aug 2024 00:54)  T(F): 97.4 (26 Aug 2024 09:13), Max: 100.6 (26 Aug 2024 00:54)  HR: 74 (26 Aug 2024 09:13) (74 - 133)  BP: 101/60 (26 Aug 2024 09:13) (101/60 - 133/94)  BP(mean): 106 (26 Aug 2024 01:30) (96 - 106)  RR: 18 (26 Aug 2024 09:13) (14 - 18)  SpO2: 96% (26 Aug 2024 09:13) (96% - 100%)  CAPILLARY BLOOD GLUCOSE        I&O's Summary        PHYSICAL EXAM: chaparoned by patient's nurse Benita    GENERAL: NAD   HEENT: EOMI, PERRL  PULM: Clear to auscultation bilaterally  CV: Regular rate and rhythm; nl S1, S2; No murmurs, rubs, or gallops  ABDOMEN: Soft, Nontender, Nondistended; Bowel sounds present  EXTREMITIES/MSK:  No edema, calf tenderness   PSYCH: AAOx3  NEUROLOGY: non-focal  SKIN: area of erythema along LLQ approx 7x5 cm (demarcated) c central area of induration approx 3x2 cm.          LABS:                        12.1   10.18 )-----------( 258      ( 26 Aug 2024 08:25 )             36.5     08-26    142  |  108  |  10  ----------------------------<  96  3.8   |  20<L>  |  0.79    Ca    9.5      26 Aug 2024 08:25    TPro  7.7  /  Alb  4.6  /  TBili  0.4  /  DBili  x   /  AST  14  /  ALT  15  /  AlkPhos  82  08-26    PT/INR - ( 26 Aug 2024 00:33 )   PT: 11.8 sec;   INR: 1.07 ratio         PTT - ( 26 Aug 2024 00:33 )  PTT:30.0 sec      Urinalysis Basic - ( 26 Aug 2024 08:25 )    Color: x / Appearance: x / SG: x / pH: x  Gluc: 96 mg/dL / Ketone: x  / Bili: x / Urobili: x   Blood: x / Protein: x / Nitrite: x   Leuk Esterase: x / RBC: x / WBC x   Sq Epi: x / Non Sq Epi: x / Bacteria: x          RADIOLOGY & ADDITIONAL TESTS:    Imaging Personally Reviewed:    Consultant(s) Notes Reviewed:      Care Discussed with Consultants/Other Providers: ALLYSON

## 2024-08-26 NOTE — H&P ADULT - PROBLEM SELECTOR PLAN 2
fever 38.1, leukocytosis 14, tachycardia 133 with abdominal wall abscess/cellulitis  - fever 38.1, leukocytosis 14, tachycardia 133 with abdominal wall abscess/cellulitis, no known immunocompromised state  - s/p IL NS in ED, will give another liter of IVF  - antibiotics as above, but will escalate to Vanco iv if no improvement  - will follow up blood cultures, declined HIV testing

## 2024-08-26 NOTE — PROGRESS NOTE ADULT - PROBLEM SELECTOR PLAN 1
s/p I&D in ED  - start Vanco. monitor trough  - will follow up blood culture and MSSA/MRSA PCR  - monitor fever curve and clinically  - wound care eval  - d/w pt

## 2024-08-26 NOTE — ED ADULT NURSE REASSESSMENT NOTE - NS ED NURSE REASSESS COMMENT FT1
Pt received from RADHA Ellis. Pt is AOx4. Breathing unlabored and spontaneously, sating 99 % on room air, skin warm and dry. Resting comfortably in bed, no pain, discomfort, or distress at this time.  VSS.  IVL in place & WDL.  Pt safety maintained, pt oriented to unit & environment, call bell between reach, instructed to use call bell for all needs, bed in lowest position. . Awaiting for a bed assignment.  Will continue to monitor.

## 2024-08-26 NOTE — H&P ADULT - NSHPSOCIALHISTORY_GEN_ALL_CORE
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

## 2024-08-26 NOTE — H&P ADULT - ADDITIONAL PE
T(C): 37.2 (26 Aug 2024 04:10), Max: 38.1 (26 Aug 2024 00:54))  HR: 95 (26 Aug 2024 04:10) (95 - 133)  BP: 125/85 (26 Aug 2024 04:10) (110/77 - 133/94)  RR: 18 (26 Aug 2024 04:10) (14 - 18)  SpO2: 98% (26 Aug 2024 04:10) (98% - 100%): room air

## 2024-08-26 NOTE — H&P ADULT - GASTROINTESTINAL COMMENTS
abdominal wall pain, significantly improved after I&D left lower abdominal wall erythema (marked) and swelling (soft) with 2x2 cm raised area with scant serous drainage

## 2024-08-26 NOTE — H&P ADULT - ASSESSMENT
24 year old female with no significant medical history presents with worsening left lower abdominal wall wound admitted with sepsis due to abdominal wall cellulitis and abscess

## 2024-08-26 NOTE — ED ADULT NURSE REASSESSMENT NOTE - NS ED NURSE REASSESS COMMENT FT1
Pt is awake, alert, and speaking in full coherent sentences. VS Stable. NAD noted. Pt is resting comfortably in stretcher, side rails up, bed in lowest position, and call bell within reach. Comfort and safety provided. Pt is admitted and awaiting bed.

## 2024-08-27 LAB
CULTURE RESULTS: ABNORMAL
SPECIMEN SOURCE: SIGNIFICANT CHANGE UP

## 2024-08-27 PROCEDURE — 99232 SBSQ HOSP IP/OBS MODERATE 35: CPT

## 2024-08-27 PROCEDURE — 99222 1ST HOSP IP/OBS MODERATE 55: CPT

## 2024-08-27 PROCEDURE — 99232 SBSQ HOSP IP/OBS MODERATE 35: CPT | Mod: GC

## 2024-08-27 RX ORDER — CHLORHEXIDINE GLUCONATE 40 MG/ML
1 SOLUTION TOPICAL DAILY
Refills: 0 | Status: DISCONTINUED | OUTPATIENT
Start: 2024-08-27 | End: 2024-08-28

## 2024-08-27 RX ADMIN — DAPTOMYCIN 116 MILLIGRAM(S): 500 INJECTION, POWDER, LYOPHILIZED, FOR SOLUTION INTRAVENOUS at 21:24

## 2024-08-27 RX ADMIN — ACETAMINOPHEN 650 MILLIGRAM(S): 325 TABLET ORAL at 21:24

## 2024-08-27 RX ADMIN — ACETAMINOPHEN 650 MILLIGRAM(S): 325 TABLET ORAL at 22:16

## 2024-08-27 NOTE — CONSULT NOTE ADULT - ASSESSMENT
Wound Consult requested to assist w/ management of    wound- pack w/ 1/4 in iodoform packing QD  US reviewed as above  Abx per Medicine/ ID  Moisturize intact skin w/ SWEEN cream BID  Nutrition Consult for optimization        encourage high quality protein, dominick/ prosource, MVI & Vit C to promote wound healing  Continue turning and positioning w/ offloading assistive devices as per protocol  Buttocks/ Sacrum Pericare as per protocol  Waffle Cushion to chair when oob to chair  Continue w/ low air loss pressure redistribution bed surface   Care as per medicine, will follow w/ you  Upon discharge f/u as outpatient at [facility] [address] [facility] [phone]  Seen w/ attng  and D/w team & RN  Thank you for this consult  Ivet Oglesby PA-C CWS [id number]  Nights/ Weekends/ Holidays please call:  General Surgery Consult pager ([phone]) for emergencies  Wound PT for multilayer leg wrapping or VAC issues (x [phone])   I spent 55minutes face to face w/ this pt of which more than 50% of the time was spent counseling & coordinating care of this pt.  Wound Consult requested to assist w/ management of  abdominal wound (abscess s/p I&D by ER    wound- pack w/ 1/4 in iodoform packing QD  US reviewed as above  Abx per Medicine/ ID  Moisturize intact skin w/ SWEEN cream BID  Nutrition Consult for optimization        encourage high quality protein, dominick/ prosource, MVI & Vit C to promote wound healing  Continue turning and positioning w/ offloading assistive devices as per protocol  Buttocks/ Sacrum Pericare as per protocol  Waffle Cushion to chair when oob to chair  Continue w/ low air loss pressure redistribution bed surface   Care as per medicine, will follow w/ you  Upon discharge f/u as outpatient at Wound Center [address] [place] [address] [phone]  Seen w/ attng  and D/w team & RN  Thank you for this consult  Ivet Oglesby PA-C CWS [id number]  Nights/ Weekends/ Holidays please call:  General Surgery Consult pager ([phone]) for emergencies  Wound PT for multilayer leg wrapping or VAC issues (x [phone])

## 2024-08-27 NOTE — PROGRESS NOTE ADULT - PROBLEM SELECTOR PLAN 2
fever 38.1, leukocytosis 14, tachycardia 133 with abdominal wall abscess/cellulitis, no known immunocompromised state  - s/p IL NS in ED, will give another liter of IVF  - antibiotics as above, but will escalate to Vanco iv if no improvement  - will follow up blood cultures, declined HIV testing fever 38.1, leukocytosis 14, tachycardia 133 with abdominal wall abscess/cellulitis, no known immunocompromised state. Afebrile since admission, leukocytosis resolved, no long tacchycardic.   - antibiotics as above, but will escalate to Vanco iv if no improvement  - BCX NGTD

## 2024-08-27 NOTE — CONSULT NOTE ADULT - NS ATTEND AMEND GEN_ALL_CORE FT
Pt seen and examined with ACP.  Assessment and plan reviewed and discussed.  Agree with above.    Status of wounds and treatment recommendations d/w  pt.  All questions answered.   Pt expressed understanding. (Friend at bedside)    I spent 55  minutes face to face w/ this pt of which more than 50% of the time was spent counseling & coordinating care of this pt.

## 2024-08-27 NOTE — PROGRESS NOTE ADULT - SUBJECTIVE AND OBJECTIVE BOX
Interval Events:    REVIEW OF SYSTEMS:  CONSTITUTIONAL: No weakness, fevers or chills  EYES/ENT: No visual changes;  No vertigo or throat pain   NECK: No pain or stiffness  RESPIRATORY: No cough, wheezing, hemoptysis; No shortness of breath  CARDIOVASCULAR: No chest pain or palpitations  GASTROINTESTINAL: No abdominal or epigastric pain. No nausea, vomiting, or hematemesis; No diarrhea or constipation. No melena or hematochezia.  GENITOURINARY: No dysuria, frequency or hematuria  NEUROLOGICAL: No numbness or weakness  SKIN: No itching, burning, rashes, or lesions   All other review of systems is negative unless indicated above.    OBJECTIVE:  ICU Vital Signs Last 24 Hrs  T(C): 36.6 (27 Aug 2024 04:48), Max: 37.6 (26 Aug 2024 20:09)  T(F): 97.9 (27 Aug 2024 04:48), Max: 99.6 (26 Aug 2024 20:09)  HR: 75 (27 Aug 2024 04:48) (74 - 83)  BP: 104/64 (27 Aug 2024 04:48) (101/60 - 117/83)  BP(mean): --  ABP: --  ABP(mean): --  RR: 18 (27 Aug 2024 04:48) (18 - 20)  SpO2: 99% (27 Aug 2024 04:48) (96% - 99%)    O2 Parameters below as of 27 Aug 2024 04:48  Patient On (Oxygen Delivery Method): room air              CAPILLARY BLOOD GLUCOSE          PHYSICAL EXAM:  General: WN/WD NAD  Neurology: A&Ox3, nonfocal, NOBEL x 4  Eyes: PERRLA/ EOMI, Gross vision intact  ENT/Neck: Neck supple, trachea midline, No JVD, Gross hearing intact  Respiratory: CTA B/L, No wheezing, rales, rhonchi  CV: RRR, +S1/S2, -S3/S4, no murmurs, rubs or gallops  Abdominal: Soft, NT, ND +BS, No HSM  MSK: 5/5 strength UE/LE bilaterally  Extremities: No edema, 2+ peripheral pulses  Skin: No Rashes, Hematoma, Ecchymosis  Incisions:   Tubes:    HOSPITAL MEDICATIONS:  MEDICATIONS  (STANDING):  DAPTOmycin IVPB      DAPTOmycin IVPB 400 milliGRAM(s) IV Intermittent every 24 hours    MEDICATIONS  (PRN):  acetaminophen     Tablet .. 650 milliGRAM(s) Oral every 6 hours PRN Temp greater or equal to 38C (100.4F), Mild Pain (1 - 3)      LABS:                        12.1   10.18 )-----------( 258      ( 26 Aug 2024 08:25 )             36.5     Hgb Trend: 12.1<--, 13.7<--  08-26    142  |  108  |  10  ----------------------------<  96  3.8   |  20<L>  |  0.79    Ca    9.5      26 Aug 2024 08:25    TPro  7.7  /  Alb  4.6  /  TBili  0.4  /  DBili  x   /  AST  14  /  ALT  15  /  AlkPhos  82  08-26    Creatinine Trend: 0.79<--, 0.84<--  PT/INR - ( 26 Aug 2024 00:33 )   PT: 11.8 sec;   INR: 1.07 ratio         PTT - ( 26 Aug 2024 00:33 )  PTT:30.0 sec  Urinalysis Basic - ( 26 Aug 2024 08:25 )    Color: x / Appearance: x / SG: x / pH: x  Gluc: 96 mg/dL / Ketone: x  / Bili: x / Urobili: x   Blood: x / Protein: x / Nitrite: x   Leuk Esterase: x / RBC: x / WBC x   Sq Epi: x / Non Sq Epi: x / Bacteria: x        Venous Blood Gas:  08-26 @ 00:20  7.39/47/31/28/48.8  VBG Lactate: 1.3      MICROBIOLOGY:     Culture - Blood (collected 26 Aug 2024 00:20)  Source: .Blood Blood-Peripheral  Preliminary Report (27 Aug 2024 04:00):    No growth at 24 hours    Culture - Blood (collected 26 Aug 2024 00:10)  Source: .Blood Blood-Peripheral  Preliminary Report (27 Aug 2024 04:00):    No growth at 24 hours           Interval Events: Patient reports persistent mild pain in the LLQ at site of I&D. Denies fever, myalgias, nausea, vomiting, diarrhea.     REVIEW OF SYSTEMS:  CONSTITUTIONAL: No weakness, fevers or chills  EYES/ENT: No visual changes;  No vertigo or throat pain   NECK: No pain or stiffness  RESPIRATORY: No cough, wheezing, hemoptysis; No shortness of breath  CARDIOVASCULAR: No chest pain or palpitations  GASTROINTESTINAL: No abdominal or epigastric pain. No nausea, vomiting, or hematemesis; No diarrhea or constipation. No melena or hematochezia.  GENITOURINARY: No dysuria, frequency or hematuria  NEUROLOGICAL: No numbness or weakness  SKIN: + LLQ, No itching, burning, rashes, or lesions   All other review of systems is negative unless indicated above.    OBJECTIVE:  ICU Vital Signs Last 24 Hrs  T(C): 36.6 (27 Aug 2024 04:48), Max: 37.6 (26 Aug 2024 20:09)  T(F): 97.9 (27 Aug 2024 04:48), Max: 99.6 (26 Aug 2024 20:09)  HR: 75 (27 Aug 2024 04:48) (74 - 83)  BP: 104/64 (27 Aug 2024 04:48) (101/60 - 117/83)  BP(mean): --  ABP: --  ABP(mean): --  RR: 18 (27 Aug 2024 04:48) (18 - 20)  SpO2: 99% (27 Aug 2024 04:48) (96% - 99%)    O2 Parameters below as of 27 Aug 2024 04:48  Patient On (Oxygen Delivery Method): room air              CAPILLARY BLOOD GLUCOSE          PHYSICAL EXAM:  GENERAL: NAD   HEENT: EOMI, PERRL  PULM: Clear to auscultation bilaterally  CV: Regular rate and rhythm; nl S1, S2; No murmurs, rubs, or gallops  ABDOMEN: Soft, Nontender, Nondistended; Bowel sounds present  EXTREMITIES/MSK:  No edema, calf tenderness   PSYCH: AAOx3  NEUROLOGY: non-focal  SKIN: area of erythema along LLQ approx 7x5 cm (demarcated) and central area of induration    HOSPITAL MEDICATIONS:  MEDICATIONS  (STANDING):  DAPTOmycin IVPB      DAPTOmycin IVPB 400 milliGRAM(s) IV Intermittent every 24 hours    MEDICATIONS  (PRN):  acetaminophen     Tablet .. 650 milliGRAM(s) Oral every 6 hours PRN Temp greater or equal to 38C (100.4F), Mild Pain (1 - 3)      LABS:                        12.1   10.18 )-----------( 258      ( 26 Aug 2024 08:25 )             36.5     Hgb Trend: 12.1<--, 13.7<--  08-26    142  |  108  |  10  ----------------------------<  96  3.8   |  20<L>  |  0.79    Ca    9.5      26 Aug 2024 08:25    TPro  7.7  /  Alb  4.6  /  TBili  0.4  /  DBili  x   /  AST  14  /  ALT  15  /  AlkPhos  82  08-26    Creatinine Trend: 0.79<--, 0.84<--  PT/INR - ( 26 Aug 2024 00:33 )   PT: 11.8 sec;   INR: 1.07 ratio         PTT - ( 26 Aug 2024 00:33 )  PTT:30.0 sec  Urinalysis Basic - ( 26 Aug 2024 08:25 )    Color: x / Appearance: x / SG: x / pH: x  Gluc: 96 mg/dL / Ketone: x  / Bili: x / Urobili: x   Blood: x / Protein: x / Nitrite: x   Leuk Esterase: x / RBC: x / WBC x   Sq Epi: x / Non Sq Epi: x / Bacteria: x        Venous Blood Gas:  08-26 @ 00:20  7.39/47/31/28/48.8  VBG Lactate: 1.3      MICROBIOLOGY:     Culture - Blood (collected 26 Aug 2024 00:20)  Source: .Blood Blood-Peripheral  Preliminary Report (27 Aug 2024 04:00):    No growth at 24 hours    Culture - Blood (collected 26 Aug 2024 00:10)  Source: .Blood Blood-Peripheral  Preliminary Report (27 Aug 2024 04:00):    No growth at 24 hours

## 2024-08-27 NOTE — PROGRESS NOTE ADULT - SUBJECTIVE AND OBJECTIVE BOX
[facility]  Division of Infectious Diseases  [phone]    Name: CINTIA AWAD  Age: 24y  Gender: Female  MRN: [id number]    Interval History--  Notes reviewed. Feels ok. No new complaints.     Past Medical History--  ADD (attention deficit disorder) without hyperactivity    History of ankle surgery        For details regarding the patient's social history, family history, and other miscellaneous elements, please refer the initial infectious diseases consultation and/or the admitting history and physical examination for this admission.    Allergies    vancomycin (Rash)  penicillin (Hives)  amoxicillin (Rash)    Intolerances        Medications--  Antibiotics:  DAPTOmycin IVPB      DAPTOmycin IVPB 400 milliGRAM(s) IV Intermittent every 24 hours    Immunologic:    Other:  acetaminophen     Tablet .. PRN      Review of Systems--  A 10-point review of systems was obtained.   Review of systems otherwise negative except as previously noted.    Physical Examination--  Vital Signs: T(F): 97.9 (08-27-24 @ 04:48), Max: 99.6 (08-26-24 @ 20:09)  HR: 75 (08-27-24 @ 04:48)  BP: 104/64 (08-27-24 @ 04:48)  RR: 18 (08-27-24 @ 04:48)  SpO2: 99% (08-27-24 @ 04:48)  Wt(kg): --  General: Nontoxic-appearing Female in no acute distress.  HEENT: AT/NC.  Anicteric. Conjunctiva pink and moist. Oropharynx clear.   Neck: Not rigid. No sense of mass.  Nodes: None palpable.  Lungs: Clear bilaterally   Heart: Regular rate and rhythm.   Abdomen: Bowel sounds present and normoactive. Soft. Nondistended. Nontender.   Extremities: No cyanosis or clubbing. No edema.   Skin: Warm. Dry. Good turgor. LLQ area of cellulitis increased from inked margins. I&D site no discharge expressible, 5mm defect. sorrounding 2cm diameter area of denuded skin. No necrosis. No malodor. Not tender.   Psychiatric: Appropriate affect and mood for situation.         Laboratory Studies--  CBC                        12.1   10.18 )-----------( 258      ( 26 Aug 2024 08:25 )             36.5       Chemistries  08-26    142  |  108  |  10  ----------------------------<  96  3.8   |  20<L>  |  0.79    Ca    9.5      26 Aug 2024 08:25    TPro  7.7  /  Alb  4.6  /  TBili  0.4  /  DBili  x   /  AST  14  /  ALT  15  /  AlkPhos  82  08-26      Culture Data    Culture - Blood (collected 26 Aug 2024 00:20)  Source: .Blood Blood-Peripheral  Preliminary Report (27 Aug 2024 04:00):    No growth at 24 hours    Culture - Blood (collected 26 Aug 2024 00:10)  Source: .Blood Blood-Peripheral  Preliminary Report (27 Aug 2024 04:00):    No growth at 24 hours

## 2024-08-27 NOTE — PROGRESS NOTE ADULT - PROBLEM SELECTOR PLAN 1
s/p I&D in ED, not sure if culture was sent  - will continue Doxy for now (PCN allergy)  - will follow up blood culture and MRSA PCR  - will monitor fever curve and clinically  - wound care eval s/p I&D in ED, no culture sent, erythema has extended  - no culture  - possible red man syndrome for vancomycin  - c/w daptomycin 4mg/kg/d  - Bcx NGTD  - f/u MRSA PCR  - will monitor fever curve and clinically  - wound care eval

## 2024-08-28 ENCOUNTER — TRANSCRIPTION ENCOUNTER (OUTPATIENT)
Age: 24
End: 2024-08-28

## 2024-08-28 VITALS — WEIGHT: 225.31 LBS

## 2024-08-28 LAB
ANION GAP SERPL CALC-SCNC: 11 MMOL/L — SIGNIFICANT CHANGE UP (ref 5–17)
BASOPHILS # BLD AUTO: 0.04 K/UL — SIGNIFICANT CHANGE UP (ref 0–0.2)
BASOPHILS NFR BLD AUTO: 0.5 % — SIGNIFICANT CHANGE UP (ref 0–2)
BUN SERPL-MCNC: 12 MG/DL — SIGNIFICANT CHANGE UP (ref 7–23)
CALCIUM SERPL-MCNC: 10.3 MG/DL — SIGNIFICANT CHANGE UP (ref 8.4–10.5)
CHLORIDE SERPL-SCNC: 106 MMOL/L — SIGNIFICANT CHANGE UP (ref 96–108)
CO2 SERPL-SCNC: 22 MMOL/L — SIGNIFICANT CHANGE UP (ref 22–31)
CREAT SERPL-MCNC: 0.73 MG/DL — SIGNIFICANT CHANGE UP (ref 0.5–1.3)
EGFR: 117 ML/MIN/1.73M2 — SIGNIFICANT CHANGE UP
EOSINOPHIL # BLD AUTO: 0.07 K/UL — SIGNIFICANT CHANGE UP (ref 0–0.5)
EOSINOPHIL NFR BLD AUTO: 0.9 % — SIGNIFICANT CHANGE UP (ref 0–6)
GLUCOSE SERPL-MCNC: 88 MG/DL — SIGNIFICANT CHANGE UP (ref 70–99)
HCT VFR BLD CALC: 39.9 % — SIGNIFICANT CHANGE UP (ref 34.5–45)
HGB BLD-MCNC: 13.5 G/DL — SIGNIFICANT CHANGE UP (ref 11.5–15.5)
IMM GRANULOCYTES NFR BLD AUTO: 0.5 % — SIGNIFICANT CHANGE UP (ref 0–0.9)
LYMPHOCYTES # BLD AUTO: 2.64 K/UL — SIGNIFICANT CHANGE UP (ref 1–3.3)
LYMPHOCYTES # BLD AUTO: 35.8 % — SIGNIFICANT CHANGE UP (ref 13–44)
MAGNESIUM SERPL-MCNC: 2.6 MG/DL — SIGNIFICANT CHANGE UP (ref 1.6–2.6)
MCHC RBC-ENTMCNC: 29.9 PG — SIGNIFICANT CHANGE UP (ref 27–34)
MCHC RBC-ENTMCNC: 33.8 GM/DL — SIGNIFICANT CHANGE UP (ref 32–36)
MCV RBC AUTO: 88.3 FL — SIGNIFICANT CHANGE UP (ref 80–100)
MONOCYTES # BLD AUTO: 0.63 K/UL — SIGNIFICANT CHANGE UP (ref 0–0.9)
MONOCYTES NFR BLD AUTO: 8.5 % — SIGNIFICANT CHANGE UP (ref 2–14)
NEUTROPHILS # BLD AUTO: 3.95 K/UL — SIGNIFICANT CHANGE UP (ref 1.8–7.4)
NEUTROPHILS NFR BLD AUTO: 53.8 % — SIGNIFICANT CHANGE UP (ref 43–77)
NRBC # BLD: 0 /100 WBCS — SIGNIFICANT CHANGE UP (ref 0–0)
PHOSPHATE SERPL-MCNC: 4.5 MG/DL — SIGNIFICANT CHANGE UP (ref 2.5–4.5)
PLATELET # BLD AUTO: 345 K/UL — SIGNIFICANT CHANGE UP (ref 150–400)
POTASSIUM SERPL-MCNC: 3.9 MMOL/L — SIGNIFICANT CHANGE UP (ref 3.5–5.3)
POTASSIUM SERPL-SCNC: 3.9 MMOL/L — SIGNIFICANT CHANGE UP (ref 3.5–5.3)
RBC # BLD: 4.52 M/UL — SIGNIFICANT CHANGE UP (ref 3.8–5.2)
RBC # FLD: 11.5 % — SIGNIFICANT CHANGE UP (ref 10.3–14.5)
SODIUM SERPL-SCNC: 139 MMOL/L — SIGNIFICANT CHANGE UP (ref 135–145)
WBC # BLD: 7.37 K/UL — SIGNIFICANT CHANGE UP (ref 3.8–10.5)
WBC # FLD AUTO: 7.37 K/UL — SIGNIFICANT CHANGE UP (ref 3.8–10.5)

## 2024-08-28 PROCEDURE — 87077 CULTURE AEROBIC IDENTIFY: CPT

## 2024-08-28 PROCEDURE — 80048 BASIC METABOLIC PNL TOTAL CA: CPT

## 2024-08-28 PROCEDURE — 80053 COMPREHEN METABOLIC PANEL: CPT

## 2024-08-28 PROCEDURE — 36415 COLL VENOUS BLD VENIPUNCTURE: CPT

## 2024-08-28 PROCEDURE — 85610 PROTHROMBIN TIME: CPT

## 2024-08-28 PROCEDURE — 81001 URINALYSIS AUTO W/SCOPE: CPT

## 2024-08-28 PROCEDURE — 84132 ASSAY OF SERUM POTASSIUM: CPT

## 2024-08-28 PROCEDURE — 85014 HEMATOCRIT: CPT

## 2024-08-28 PROCEDURE — 82435 ASSAY OF BLOOD CHLORIDE: CPT

## 2024-08-28 PROCEDURE — 87040 BLOOD CULTURE FOR BACTERIA: CPT

## 2024-08-28 PROCEDURE — 87637 SARSCOV2&INF A&B&RSV AMP PRB: CPT

## 2024-08-28 PROCEDURE — 82947 ASSAY GLUCOSE BLOOD QUANT: CPT

## 2024-08-28 PROCEDURE — 85730 THROMBOPLASTIN TIME PARTIAL: CPT

## 2024-08-28 PROCEDURE — 83735 ASSAY OF MAGNESIUM: CPT

## 2024-08-28 PROCEDURE — 99239 HOSP IP/OBS DSCHRG MGMT >30: CPT | Mod: GC

## 2024-08-28 PROCEDURE — 96374 THER/PROPH/DIAG INJ IV PUSH: CPT

## 2024-08-28 PROCEDURE — 76705 ECHO EXAM OF ABDOMEN: CPT

## 2024-08-28 PROCEDURE — 85025 COMPLETE CBC W/AUTO DIFF WBC: CPT

## 2024-08-28 PROCEDURE — 85018 HEMOGLOBIN: CPT

## 2024-08-28 PROCEDURE — 82330 ASSAY OF CALCIUM: CPT

## 2024-08-28 PROCEDURE — 99233 SBSQ HOSP IP/OBS HIGH 50: CPT

## 2024-08-28 PROCEDURE — 87086 URINE CULTURE/COLONY COUNT: CPT

## 2024-08-28 PROCEDURE — 99285 EMERGENCY DEPT VISIT HI MDM: CPT | Mod: 25

## 2024-08-28 PROCEDURE — 96375 TX/PRO/DX INJ NEW DRUG ADDON: CPT

## 2024-08-28 PROCEDURE — 82803 BLOOD GASES ANY COMBINATION: CPT

## 2024-08-28 PROCEDURE — 84100 ASSAY OF PHOSPHORUS: CPT

## 2024-08-28 PROCEDURE — 83605 ASSAY OF LACTIC ACID: CPT

## 2024-08-28 PROCEDURE — 84295 ASSAY OF SERUM SODIUM: CPT

## 2024-08-28 RX ORDER — DOXYCYCLINE MONOHYDRATE 100 MG
1 TABLET ORAL
Qty: 14 | Refills: 0
Start: 2024-08-28 | End: 2024-09-03

## 2024-08-28 RX ORDER — ACETAMINOPHEN 325 MG/1
2 TABLET ORAL
Refills: 0 | DISCHARGE

## 2024-08-28 RX ADMIN — CHLORHEXIDINE GLUCONATE 1 APPLICATION(S): 40 SOLUTION TOPICAL at 11:19

## 2024-08-28 NOTE — DISCHARGE NOTE PROVIDER - NSFOLLOWUPCLINICS_GEN_ALL_ED_FT
[facility] - Infectious Disease  Infectious Disease  [address] [facility], [place] [address]  Phone: [phone]  Fax:   Follow Up Time: 2 weeks    [facility] [place]  Internal Medicine  [address] [place] [address]  Phone: [phone]  Fax: [phone]  Follow Up Time: 1 week

## 2024-08-28 NOTE — PROGRESS NOTE ADULT - REASON FOR ADMISSION
abdominal wall wound

## 2024-08-28 NOTE — PROGRESS NOTE ADULT - ASSESSMENT
S/P I&D of abscess  Possible red man syndrome for vancomycin  Unfortunately no cx data  Drainage the mainstay of therapy, abx secondary    8/27: Too early to expect response from antibiotics, but while not systemically ill, not febrile, no new labs erythema extended. Nothing to culture at present.     Suggestions--  Continue daptomycin 4mg/kg/d  Serial exams  Remain vigilant for need for repeat imaging/further I&D if does not improve  Will review with primary team.    Pedro Olmedo MD  Attending Physician  [facility]  Division of Infectious Diseases  [phone]  
24 year old female with no significant medical history presents with worsening left lower abdominal wall wound admitted with sepsis due to abdominal wall cellulitis and abscess
S/P I&D of abscess  Possible red man syndrome for vancomycin  Unfortunately no cx data  Drainage the mainstay of therapy, abx secondary    8/27: Too early to expect response from antibiotics, but while not systemically ill, not febrile, no new labs erythema extended. Nothing to culture at present.   8/28: Clear improvement.  No objection to outpatient management at this juncture.    Doxycycline  Dosing 100mg PO/IV Q12H  Dose does not require adjustment with hepatic or renal dysfunction  Remain mindful of potential adverse effects including but not limited to:   Rash and allergy as with any antibiotic.   Photosensitivity. This can persist for several days after stopping the drug.  GI intolerance, particularly if taken on an empty stomach or just before going to sleep (erosive esophagitis)  Left dizziness and vertigo have been described.  There is also an issue with malabsorption when taken with PO polyavlent cation (e.g. calcium/dairy)- avoid or space out by at least 2 h    Suggestions  No objection to outpatient management  Local care/packing per primary team  Doxycycline 100 mg p.o. twice per day reasonable for 7 days  Happy to see patient in follow-up in the office  Discussed with patient in clear layman's terms and all questions answered to the best of my ability  Discussed with primary team    Pedro Olmedo MD  Attending Physician  [facility]  Division of Infectious Diseases  [phone]  
24 year old female with no significant medical history presents with worsening left lower abdominal wall wound admitted with sepsis due to abdominal wall cellulitis and abscess
24 year old female with no significant medical history presents with worsening left lower abdominal wall wound admitted with sepsis due to abdominal wall cellulitis and abscess

## 2024-08-28 NOTE — PROGRESS NOTE ADULT - PROBLEM SELECTOR PLAN 2
fever 38.1, leukocytosis 14, tachycardia 133 with abdominal wall abscess/cellulitis, no known immunocompromised state. Afebrile since admission, leukocytosis resolved, no long tacchycardic.   - antibiotics as above, but will escalate to Vanco iv if no improvement  - BCX NGTD

## 2024-08-28 NOTE — PROGRESS NOTE ADULT - SUBJECTIVE AND OBJECTIVE BOX
[facility]  Division of Infectious Diseases  [phone]    Name: CINTIA AWAD  Age: 24y  Gender: Female  MRN: [id number]    Interval History--  Notes reviewed.     Past Medical History--  ADD (attention deficit disorder) without hyperactivity    History of ankle surgery        For details regarding the patient's social history, family history, and other miscellaneous elements, please refer the initial infectious diseases consultation and/or the admitting history and physical examination for this admission.    Allergies    vancomycin (Rash)  penicillin (Hives)  amoxicillin (Rash)    Intolerances        Medications--  Antibiotics:  DAPTOmycin IVPB 400 milliGRAM(s) IV Intermittent every 24 hours  DAPTOmycin IVPB        Immunologic:    Other:  acetaminophen     Tablet .. PRN  chlorhexidine 4% Liquid      Review of Systems--  A 10-point review of systems was obtained.     Pertinent positives and negatives--  Constitutional: No fevers. No Chills. No Rigors.   Cardiovascular: No chest pain. No palpitations.  Respiratory: No shortness of breath. No cough.  Gastrointestinal: No nausea or vomiting. No diarrhea or constipation.   Psychiatric: Pleasant. Appropriate affect.    Review of systems otherwise negative except as previously noted.    Physical Examination--  Vital Signs: T(F): 98.1 (08-28-24 @ 04:00), Max: 98.6 (08-27-24 @ 20:11)  HR: 69 (08-28-24 @ 04:00)  BP: 102/66 (08-28-24 @ 04:00)  RR: 18 (08-28-24 @ 04:00)  SpO2: 99% (08-28-24 @ 04:00)  Wt(kg): --  General: Nontoxic-appearing Female in no acute distress.  HEENT: AT/NC. PERRL. EOMI. Anicteric. Conjunctiva pink and moist. Oropharynx clear. Dentition fair.  Neck: Not rigid. No sense of mass.  Nodes: None palpable.  Lungs: Clear bilaterally without rales, wheezing or rhonchi  Heart: Regular rate and rhythm. No Murmur. No rub. No gallop. No palpable thrill.  Abdomen: Bowel sounds present and normoactive. Soft. Nondistended. Nontender. No sense of mass. No organomegaly.  Back: No spinal tenderness. No costovertebral angle tenderness.   Extremities: No cyanosis or clubbing. No edema.   Skin: Warm. Dry. Good turgor. No rash. No vasculitic stigmata.  Psychiatric: Appropriate affect and mood for situation.         Laboratory Studies--  CBC                        13.5   7.37  )-----------( 345      ( 28 Aug 2024 05:20 )             39.9       Chemistries  08-28    139  |  106  |  12  ----------------------------<  88  3.9   |  22  |  0.73    Ca    10.3      28 Aug 2024 05:22  Phos  4.5     08-28  Mg     2.6     08-28        Culture Data    Culture - Urine (collected 26 Aug 2024 01:57)  Source: Clean Catch Clean Catch (Midstream)  Final Report (27 Aug 2024 21:39):    10,000 - 49,000 CFU/mL Streptococcus agalactiae (Group B)    Streptococcus agalactiae (Group B) isolated    Group B streptococci are susceptible to ampicillin,    penicillin and cefazolin, but may be resistant to    erythromycin and clindamycin.    Culture - Blood (collected 26 Aug 2024 00:20)  Source: .Blood Blood-Peripheral  Preliminary Report (28 Aug 2024 04:01):    No growth at 48 Hours    Culture - Blood (collected 26 Aug 2024 00:10)  Source: .Blood Blood-Peripheral  Preliminary Report (28 Aug 2024 04:01):    No growth at 48 Hours             [facility]  Division of Infectious Diseases  [phone]    Name: CINTIA AWAD  Age: 24y  Gender: Female  MRN: [id number]    Interval History--  Notes reviewed. Seen earlier today.  No complaints.  Tolerating a box without issues.  No fevers, chills, or rigors.  Denies significant pain. Wound care evaluation appreciated.    Past Medical History--  ADD (attention deficit disorder) without hyperactivity    History of ankle surgery        For details regarding the patient's social history, family history, and other miscellaneous elements, please refer the initial infectious diseases consultation and/or the admitting history and physical examination for this admission.    Allergies    vancomycin (Rash)  penicillin (Hives)  amoxicillin (Rash)    Intolerances        Medications--  Antibiotics:  DAPTOmycin IVPB 400 milliGRAM(s) IV Intermittent every 24 hours  DAPTOmycin IVPB        Immunologic:    Other:  acetaminophen     Tablet .. PRN  chlorhexidine 4% Liquid      Review of Systems--  A 10-point review of systems was obtained.   Review of systems otherwise negative except as previously noted.    Physical Examination--  Vital Signs: T(F): 98.1 (08-28-24 @ 04:00), Max: 98.6 (08-27-24 @ 20:11)  HR: 69 (08-28-24 @ 04:00)  BP: 102/66 (08-28-24 @ 04:00)  RR: 18 (08-28-24 @ 04:00)  SpO2: 99% (08-28-24 @ 04:00)  Wt(kg): --  General: Nontoxic-appearing Female in no acute distress.  HEENT: AT/NC.  Anicteric. Conjunctiva pink and moist. Oropharynx clear.   Neck: Not rigid. No sense of mass.  Nodes: None palpable.  Lungs: Clear bilaterally   Heart: Regular rate and rhythm.   Abdomen: Bowel sounds present and normoactive. Soft. Nondistended. Nontender.   Extremities: No cyanosis or clubbing. No edema.   Skin: Warm. Dry. Good turgor. Left lower quadrant area of cellulitis with clear improvement in both intensity and extent of erythema.  I&D site now packed with Aquacel, no discharge at this juncture.  Still no concern for necrosis or deeper infection.  Psychiatric: Appropriate affect and mood for situation.         Laboratory Studies--  CBC                        13.5   7.37  )-----------( 345      ( 28 Aug 2024 05:20 )             39.9       Chemistries  08-28    139  |  106  |  12  ----------------------------<  88  3.9   |  22  |  0.73    Ca    10.3      28 Aug 2024 05:22  Phos  4.5     08-28  Mg     2.6     08-28        Culture Data    Culture - Urine (collected 26 Aug 2024 01:57)  Source: Clean Catch Clean Catch (Midstream)  Final Report (27 Aug 2024 21:39):    10,000 - 49,000 CFU/mL Streptococcus agalactiae (Group B)    Streptococcus agalactiae (Group B) isolated    Group B streptococci are susceptible to ampicillin,    penicillin and cefazolin, but may be resistant to    erythromycin and clindamycin.    Culture - Blood (collected 26 Aug 2024 00:20)  Source: .Blood Blood-Peripheral  Preliminary Report (28 Aug 2024 04:01):    No growth at 48 Hours    Culture - Blood (collected 26 Aug 2024 00:10)  Source: .Blood Blood-Peripheral  Preliminary Report (28 Aug 2024 04:01):    No growth at 48 Hours

## 2024-08-28 NOTE — DISCHARGE NOTE NURSING/CASE MANAGEMENT/SOCIAL WORK - NSDCPEFALRISK_GEN_ALL_CORE
For information on Fall & Injury Prevention, visit: [email or web address] OR  [email or web address] OR  https://www.cdc.gov/steadi/patient.html

## 2024-08-28 NOTE — DISCHARGE NOTE PROVIDER - HOSPITAL COURSE
HPI:  24 year old female with no significant medical history presents with worsening abdominal wall LLQ redness, swelling and pain with drainage of pus and blood this morning.  Patient noticed "a pimple-like" lesion on her left lower abdominal wall 5 days ago, initially thought was a bug bite, but progressively worse with redness, swelling and pain "due to the pressure inside".  Denies fever/chill, sick contact, trauma, prior history of skin infection, immunocompromised state or drugs.  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  No prior MRSA colonization (26 Aug 2024 07:47)    Hospital Course:      Important Medication Changes and Reason:    Active or Pending Issues Requiring Follow-up:      [X] Full code  [ ] DNR  [ ] Hospice    Discharge Diagnoses: Abdominal wall abscess          HPI:  24 year old female with no significant medical history presents with worsening abdominal wall LLQ redness, swelling and pain with drainage of pus and blood this morning.  Patient noticed "a pimple-like" lesion on her left lower abdominal wall 5 days ago, initially thought was a bug bite, but progressively worse with redness, swelling and pain "due to the pressure inside".  Denies fever/chill, sick contact, trauma, prior history of skin infection, immunocompromised state or drugs.  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  No prior MRSA colonization (26 Aug 2024 07:47)    Hospital Course:  Patient was admitted to medicine with sepsis given fever 38.1, leukocytosis 14, tachycardia 133. She was given an I&D for her abscess with blood and purulent drainage. Patient was given one dose of vancomycin 1500mg for which she had a possible red man syndrome reaction. Her medication was changed to Daptomycin 4mg/kg per ID. The swelling and erythema initially progressed on the first day, and receded by the second day inpatient. Pain improved. Wound care packed the abscess. Nursing provided teaching for how to treat the abscess at home. Patient was discharged with Doxycycline 100mg BID x 7 days. Patient was instructed to return to the emergency room if her abscess is worsening in purulence and erythema, and if she has new fever.     Important Medication Changes and Reason:  1. Doxycycline 100mg BID x 7 days sent to pharmacy    Active or Pending Issues Requiring Follow-up:  None    [X] Full code  [ ] DNR  [ ] Hospice    Discharge Diagnoses: Abdominal wall abscess          HPI:  The patient is a 24 year old female with no significant medical history presents with worsening abdominal wall LLQ redness, swelling and pain with drainage of pus and blood this morning.  Patient noticed "a pimple-like" lesion on her left lower abdominal wall 5 days ago, initially thought was a bug bite, but progressively worse with redness, swelling and pain "due to the pressure inside".  Denies fever/chill, sick contact, trauma, prior history of skin infection, immunocompromised state or drugs.  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  No prior MRSA colonization (26 Aug 2024 07:47)    Hospital Course:  Patient was admitted to medicine with sepsis given fever 38.1, leukocytosis 14, tachycardia 133. She was given an I&D for her abscess with blood and purulent drainage. Patient was given one dose of vancomycin 1500mg for which she had a possible red man syndrome reaction. Her medication was changed to Daptomycin 4mg/kg per ID. The swelling and erythema initially progressed on the first day, and receded by the second day inpatient. Pain improved. Wound care packed the abscess. Nursing provided teaching for how to treat the abscess at home. Patient was discharged with Doxycycline 100mg BID x 7 days. Patient was instructed to return to the emergency room if her abscess is worsening in purulence and erythema, and if she has new fever.     Important Medication Changes and Reason:  1. Doxycycline 100mg BID x 7 days sent to pharmacy    Active or Pending Issues Requiring Follow-up:  None    [X] Full code  [ ] DNR  [ ] Hospice    Discharge Diagnoses: Abdominal wall abscess

## 2024-08-28 NOTE — PROGRESS NOTE ADULT - SUBJECTIVE AND OBJECTIVE BOX
Interval Events:    REVIEW OF SYSTEMS:  CONSTITUTIONAL: No weakness, fevers or chills  EYES/ENT: No visual changes;  No vertigo or throat pain   NECK: No pain or stiffness  RESPIRATORY: No cough, wheezing, hemoptysis; No shortness of breath  CARDIOVASCULAR: No chest pain or palpitations  GASTROINTESTINAL: No abdominal or epigastric pain. No nausea, vomiting, or hematemesis; No diarrhea or constipation. No melena or hematochezia.  GENITOURINARY: No dysuria, frequency or hematuria  NEUROLOGICAL: No numbness or weakness  SKIN: No itching, burning, rashes, or lesions   All other review of systems is negative unless indicated above.    OBJECTIVE:  ICU Vital Signs Last 24 Hrs  T(C): 36.7 (28 Aug 2024 04:00), Max: 37 (27 Aug 2024 20:11)  T(F): 98.1 (28 Aug 2024 04:00), Max: 98.6 (27 Aug 2024 20:11)  HR: 69 (28 Aug 2024 04:00) (69 - 96)  BP: 102/66 (28 Aug 2024 04:00) (102/66 - 116/76)  BP(mean): --  ABP: --  ABP(mean): --  RR: 18 (28 Aug 2024 04:00) (18 - 18)  SpO2: 99% (28 Aug 2024 04:00) (98% - 99%)    O2 Parameters below as of 28 Aug 2024 04:00  Patient On (Oxygen Delivery Method): room air              08-27 @ 07:01  -  08-28 @ 07:00  --------------------------------------------------------  IN: 400 mL / OUT: 0 mL / NET: 400 mL      CAPILLARY BLOOD GLUCOSE          PHYSICAL EXAM:  General: WN/WD NAD  Neurology: A&Ox3, nonfocal, NOBLE x 4  Eyes: PERRLA/ EOMI, Gross vision intact  ENT/Neck: Neck supple, trachea midline, No JVD, Gross hearing intact  Respiratory: CTA B/L, No wheezing, rales, rhonchi  CV: RRR, +S1/S2, -S3/S4, no murmurs, rubs or gallops  Abdominal: Soft, NT, ND +BS, No HSM  MSK: 5/5 strength UE/LE bilaterally  Extremities: No edema, 2+ peripheral pulses  Skin: No Rashes, Hematoma, Ecchymosis  Incisions:   Tubes:    HOSPITAL MEDICATIONS:  MEDICATIONS  (STANDING):  chlorhexidine 4% Liquid 1 Application(s) Topical daily  DAPTOmycin IVPB 400 milliGRAM(s) IV Intermittent every 24 hours  DAPTOmycin IVPB        MEDICATIONS  (PRN):  acetaminophen     Tablet .. 650 milliGRAM(s) Oral every 6 hours PRN Temp greater or equal to 38C (100.4F), Mild Pain (1 - 3)      LABS:                        13.5   7.37  )-----------( 345      ( 28 Aug 2024 05:20 )             39.9     Hgb Trend: 13.5<--, 12.1<--, 13.7<--  08-28    139  |  106  |  12  ----------------------------<  88  3.9   |  22  |  0.73    Ca    10.3      28 Aug 2024 05:22  Phos  4.5     08-28  Mg     2.6     08-28      Creatinine Trend: 0.73<--, 0.79<--, 0.84<--    Urinalysis Basic - ( 28 Aug 2024 05:22 )    Color: x / Appearance: x / SG: x / pH: x  Gluc: 88 mg/dL / Ketone: x  / Bili: x / Urobili: x   Blood: x / Protein: x / Nitrite: x   Leuk Esterase: x / RBC: x / WBC x   Sq Epi: x / Non Sq Epi: x / Bacteria: x            MICROBIOLOGY:     Culture - Urine (collected 26 Aug 2024 01:57)  Source: Clean Catch Clean Catch (Midstream)  Final Report (27 Aug 2024 21:39):    10,000 - 49,000 CFU/mL Streptococcus agalactiae (Group B)    Streptococcus agalactiae (Group B) isolated    Group B streptococci are susceptible to ampicillin,    penicillin and cefazolin, but may be resistant to    erythromycin and clindamycin.    Culture - Blood (collected 26 Aug 2024 00:20)  Source: .Blood Blood-Peripheral  Preliminary Report (28 Aug 2024 04:01):    No growth at 48 Hours    Culture - Blood (collected 26 Aug 2024 00:10)  Source: .Blood Blood-Peripheral  Preliminary Report (28 Aug 2024 04:01):    No growth at 48 Hours           Interval Events: No acute events overnight. Denies chest pain, SOB, dysuria. Reports that her LLQ pain is 2/10, only tender to palpation.     REVIEW OF SYSTEMS:  CONSTITUTIONAL: No weakness, fevers or chills  EYES/ENT: No visual changes;  No vertigo or throat pain   NECK: No pain or stiffness  RESPIRATORY: No cough, wheezing, hemoptysis; No shortness of breath  CARDIOVASCULAR: No chest pain or palpitations  GASTROINTESTINAL: No abdominal or epigastric pain. No nausea, vomiting, or hematemesis; No diarrhea or constipation. No melena or hematochezia.  GENITOURINARY: No dysuria, frequency or hematuria  NEUROLOGICAL: No numbness or weakness  SKIN: +LLQ Abscess  All other review of systems is negative unless indicated above.    OBJECTIVE:  ICU Vital Signs Last 24 Hrs  T(C): 36.7 (28 Aug 2024 04:00), Max: 37 (27 Aug 2024 20:11)  T(F): 98.1 (28 Aug 2024 04:00), Max: 98.6 (27 Aug 2024 20:11)  HR: 69 (28 Aug 2024 04:00) (69 - 96)  BP: 102/66 (28 Aug 2024 04:00) (102/66 - 116/76)  BP(mean): --  ABP: --  ABP(mean): --  RR: 18 (28 Aug 2024 04:00) (18 - 18)  SpO2: 99% (28 Aug 2024 04:00) (98% - 99%)    O2 Parameters below as of 28 Aug 2024 04:00  Patient On (Oxygen Delivery Method): room air              08-27 @ 07:01  -  08-28 @ 07:00  --------------------------------------------------------  IN: 400 mL / OUT: 0 mL / NET: 400 mL      CAPILLARY BLOOD GLUCOSE          PHYSICAL EXAM:  General: WN/WD NAD  Neurology: A&Ox3, nonfocal, NOBLE x 4  Eyes: PERRLA/ EOMI, Gross vision intact  ENT/Neck: Neck supple, trachea midline, No JVD, Gross hearing intact  Respiratory: CTA B/L, No wheezing, rales, rhonchi  CV: RRR, +S1/S2, -S3/S4, no murmurs, rubs or gallops  Abdominal: Soft, NT, ND +BS, No HSM  MSK: 5/5 strength UE/LE bilaterally  Extremities: No edema, 2+ peripheral pulses  Skin: No Rashes, Hematoma, Ecchymosis  Incisions:   Tubes:    HOSPITAL MEDICATIONS:  MEDICATIONS  (STANDING):  chlorhexidine 4% Liquid 1 Application(s) Topical daily  DAPTOmycin IVPB 400 milliGRAM(s) IV Intermittent every 24 hours  DAPTOmycin IVPB        MEDICATIONS  (PRN):  acetaminophen     Tablet .. 650 milliGRAM(s) Oral every 6 hours PRN Temp greater or equal to 38C (100.4F), Mild Pain (1 - 3)      LABS:                        13.5   7.37  )-----------( 345      ( 28 Aug 2024 05:20 )             39.9     Hgb Trend: 13.5<--, 12.1<--, 13.7<--  08-28    139  |  106  |  12  ----------------------------<  88  3.9   |  22  |  0.73    Ca    10.3      28 Aug 2024 05:22  Phos  4.5     08-28  Mg     2.6     08-28      Creatinine Trend: 0.73<--, 0.79<--, 0.84<--    Urinalysis Basic - ( 28 Aug 2024 05:22 )    Color: x / Appearance: x / SG: x / pH: x  Gluc: 88 mg/dL / Ketone: x  / Bili: x / Urobili: x   Blood: x / Protein: x / Nitrite: x   Leuk Esterase: x / RBC: x / WBC x   Sq Epi: x / Non Sq Epi: x / Bacteria: x            MICROBIOLOGY:     Culture - Urine (collected 26 Aug 2024 01:57)  Source: Clean Catch Clean Catch (Midstream)  Final Report (27 Aug 2024 21:39):    10,000 - 49,000 CFU/mL Streptococcus agalactiae (Group B)    Streptococcus agalactiae (Group B) isolated    Group B streptococci are susceptible to ampicillin,    penicillin and cefazolin, but may be resistant to    erythromycin and clindamycin.    Culture - Blood (collected 26 Aug 2024 00:20)  Source: .Blood Blood-Peripheral  Preliminary Report (28 Aug 2024 04:01):    No growth at 48 Hours    Culture - Blood (collected 26 Aug 2024 00:10)  Source: .Blood Blood-Peripheral  Preliminary Report (28 Aug 2024 04:01):    No growth at 48 Hours

## 2024-08-28 NOTE — DISCHARGE NOTE PROVIDER - NSDCCPCAREPLAN_GEN_ALL_CORE_FT
PRINCIPAL DISCHARGE DIAGNOSIS  Diagnosis: Abdominal wall abscess  Assessment and Plan of Treatment: You were admitted for IV antibiotics and drainage of your abdominal abscess. An ultrasound was obtained that approximated the size of this abscess. You will be discharged on 7d of oral antibiotics to take until your followup appointment with infectious disease. Additionally the abscess has been left open to heal so that it may adequately drain. Please follow the instructions regarding management and packing of the wound until it has fully healed.  Examine the area daily for signs of tenderness, swelling, and warmth in the abdominal wall. Symptoms of a recurrent infection may include pain, fever, and malaise.  Cleaning and Dressing: Keep the wound clean and dry. Change dressings regularly and monitor for signs of secondary infection.  Follow-Up: Regular follow-up visits are important to monitor the wound's healing and ensure that the infection is resolving. Please followup with your PCP and the infectious disease physicians upon discharge.   You may take acetaminophen or NSAIDs, depending on the severity of pain as needed.

## 2024-08-28 NOTE — DISCHARGE NOTE PROVIDER - NSDCHHENCOUNTER_GEN_ALL_CORE
normal... Well appearing, well nourished, awake, alert, oriented to person, place, time/situation and in no apparent distress. 28-Aug-2024

## 2024-08-28 NOTE — PROGRESS NOTE ADULT - ATTENDING COMMENTS
The patient is a 24 year old female with no significant medical history presents with worsening left lower abdominal wall abscess, s/p I & D in ED, found to be in sepsis.    1. Resolved sepsis due to abdominal wall abscess, s/p I & D    - feels ok, overall wound looks better, wound packing done by wound care, VSS   - spoke to ID- d/c on PO doxycycline 100mg bid, outpatient f/u with wound care. Blood c/s neg  - Patient knows how to pack the wound and dressing  - d/c home today. spoke to SW about d/c given no insurance    - Time spent 45 min excluding house staff teaching
The patient is a 24 year old female with no significant medical history presents with worsening left lower abdominal wall abscess, s/p I & D in ED, found to be in sepsis.    1. Resolved sepsis due to abdominal wall abscess, s/p I & D    - afebrile, WBC wnl, LLQ abdominal wall- erythematous, less tender, warm, no draining discharge. Reviewed US   - spoke to ID- c/w IV Daptomycin, f/u blood c/s ( no wound c.s sent at ED)   - wound care plans noted  - d/c planning tomorrow on PO anbx if stable per ID  - Time spent 45 min excluding house staff teaching

## 2024-08-28 NOTE — DISCHARGE NOTE PROVIDER - NSDCCPTREATMENT_GEN_ALL_CORE_FT
PRINCIPAL PROCEDURE  Procedure: Ultrasound of abdominal wall  Findings and Treatment: A 2.02 cm x 0.67 cm x 1.47 cm complex fluid collection with surrounding   hyperemia, cobblestoning, surrounding soft tissue edema seen in   superficial abdominal wall with no apparent extension into subcutaneous   tissue.  No blood flow or vasculature seen within fluid collection.  IMPRESSION:  Complex fluid collection within anterior abdominal wall.

## 2024-08-28 NOTE — DISCHARGE NOTE NURSING/CASE MANAGEMENT/SOCIAL WORK - PATIENT PORTAL LINK FT
You can access the FollowMyHealth Patient Portal offered by [facility] by registering at the following website: [email or web address]. By joining [employer]’s FollowMyHealth portal, you will also be able to view your health information using other applications (apps) compatible with our system.

## 2024-08-28 NOTE — DISCHARGE NOTE NURSING/CASE MANAGEMENT/SOCIAL WORK - NSDCFUADDAPPT_GEN_ALL_CORE_FT
Referral to [facility] for Skilled Nursing/ Wound care. [facility] case requested. Patient notified.

## 2024-08-28 NOTE — PROGRESS NOTE ADULT - PROBLEM SELECTOR PLAN 1
s/p I&D in ED, no culture sent, erythema has extended  - no culture  - possible red man syndrome for vancomycin  - c/w daptomycin 4mg/kg/d  - Bcx NGTD  - f/u MRSA PCR  - will monitor fever curve and clinically  - wound care eval s/p I&D in ED, no culture sent, erythema now receding  - no culture  - possible red man syndrome for vancomycin  - c/w daptomycin 4mg/kg/d  - transition to Doxycycline 100mg BID x 7 days at discharge  - Bcx NGTD  - f/u MRSA PCR  - will monitor fever curve and clinically  - wound care eval

## 2024-08-31 LAB
CULTURE RESULTS: SIGNIFICANT CHANGE UP
CULTURE RESULTS: SIGNIFICANT CHANGE UP
SPECIMEN SOURCE: SIGNIFICANT CHANGE UP
SPECIMEN SOURCE: SIGNIFICANT CHANGE UP

## 2025-05-06 NOTE — CONSULT NOTE ADULT - PROVIDER SPECIALTY LIST ADULT
Preop diagnosis:  Lumbar radiculopathy  Postop diagnosis:  same  LP at L45  15 mL of 200M Isovue  Complications: none  EBL: 0 mL  Specimens: none  
Wound Care
Infectious Disease

## 2025-05-29 NOTE — ED PROVIDER NOTE - CCCP TRG CHIEF CMPLNT
[place], [place]  [address] [place] [id number]  Phone: [phone]  Fax: [phone]  Compression Socks  At [facility] in [place] and [place], we're proud to provide our customers with some of the best compression socks in the medical industry. If you suffer from varicose veins or spider veins, compression socks are a safe and effective solution to remedy your fatigued, swelling, itching and burning legs. With the large variety of compression socks available on the market today, it can be both intimidating and challenging trying to choose the right pair of compression socks for you. When you visit our medical equipment and supplies store in [place], our certified compression specialists will take the time to understand your needs and will recommend the best brand and strength compression socks for your condition. We will also work closely with your insurance provider to ensure you receive the benefits you're entitled to, so you receive the best pricing for your compression socks. Contact our medical supplies experts to learn more or browse our special offers online today!  [place]  [address] [place] [address]  Phone: [phone]  Fax: [phone]      If compression hose are not covered by insurance, you may purchase hose online at [employer] or you may call [phone].  Please make sure that the level of compression includes 20-30 mmHg (this is considered firm)    Compression hoses also can be obtained at:   [employer]  [facility]: [address] [place] [address]  [phone]    
syncope